# Patient Record
Sex: FEMALE | Race: BLACK OR AFRICAN AMERICAN | Employment: FULL TIME | URBAN - METROPOLITAN AREA
[De-identification: names, ages, dates, MRNs, and addresses within clinical notes are randomized per-mention and may not be internally consistent; named-entity substitution may affect disease eponyms.]

---

## 2017-01-23 ENCOUNTER — OFFICE VISIT (OUTPATIENT)
Dept: INTERNAL MEDICINE CLINIC | Age: 57
End: 2017-01-23

## 2017-01-23 VITALS
TEMPERATURE: 98 F | HEART RATE: 72 BPM | HEIGHT: 65 IN | SYSTOLIC BLOOD PRESSURE: 138 MMHG | WEIGHT: 189.6 LBS | DIASTOLIC BLOOD PRESSURE: 78 MMHG | BODY MASS INDEX: 31.59 KG/M2

## 2017-01-23 DIAGNOSIS — I10 ESSENTIAL HYPERTENSION: Primary | ICD-10-CM

## 2017-01-23 DIAGNOSIS — R05.9 COUGH: ICD-10-CM

## 2017-01-23 DIAGNOSIS — Z11.4 SCREENING FOR HIV (HUMAN IMMUNODEFICIENCY VIRUS): ICD-10-CM

## 2017-01-23 DIAGNOSIS — Z12.11 SCREENING FOR MALIGNANT NEOPLASM OF COLON: ICD-10-CM

## 2017-01-23 DIAGNOSIS — Z23 NEEDS FLU SHOT: ICD-10-CM

## 2017-01-23 DIAGNOSIS — Z12.39 SCREENING FOR MALIGNANT NEOPLASM OF BREAST: ICD-10-CM

## 2017-01-23 DIAGNOSIS — Z11.59 NEED FOR HEPATITIS C SCREENING TEST: ICD-10-CM

## 2017-01-23 LAB
A/G RATIO: 1.5 (ref 1.1–2.2)
ALBUMIN SERPL-MCNC: 4.3 G/DL (ref 3.4–5)
ALP BLD-CCNC: 101 U/L (ref 40–129)
ALT SERPL-CCNC: 19 U/L (ref 10–40)
ANION GAP SERPL CALCULATED.3IONS-SCNC: 16 MMOL/L (ref 3–16)
AST SERPL-CCNC: 19 U/L (ref 15–37)
BILIRUB SERPL-MCNC: <0.2 MG/DL (ref 0–1)
BUN BLDV-MCNC: 23 MG/DL (ref 7–20)
CALCIUM SERPL-MCNC: 9.5 MG/DL (ref 8.3–10.6)
CHLORIDE BLD-SCNC: 100 MMOL/L (ref 99–110)
CHOLESTEROL, TOTAL: 161 MG/DL (ref 0–199)
CO2: 27 MMOL/L (ref 21–32)
CREAT SERPL-MCNC: 0.6 MG/DL (ref 0.6–1.1)
GFR AFRICAN AMERICAN: >60
GFR NON-AFRICAN AMERICAN: >60
GLOBULIN: 2.9 G/DL
GLUCOSE BLD-MCNC: 83 MG/DL (ref 70–99)
HDLC SERPL-MCNC: 66 MG/DL (ref 40–60)
HEPATITIS C ANTIBODY INTERPRETATION: NORMAL
LDL CHOLESTEROL CALCULATED: 85 MG/DL
POTASSIUM SERPL-SCNC: 4.5 MMOL/L (ref 3.5–5.1)
SODIUM BLD-SCNC: 143 MMOL/L (ref 136–145)
TOTAL PROTEIN: 7.2 G/DL (ref 6.4–8.2)
TRIGL SERPL-MCNC: 48 MG/DL (ref 0–150)
VLDLC SERPL CALC-MCNC: 10 MG/DL

## 2017-01-23 PROCEDURE — 90471 IMMUNIZATION ADMIN: CPT | Performed by: INTERNAL MEDICINE

## 2017-01-23 PROCEDURE — 90688 IIV4 VACCINE SPLT 0.5 ML IM: CPT | Performed by: INTERNAL MEDICINE

## 2017-01-23 PROCEDURE — 99213 OFFICE O/P EST LOW 20 MIN: CPT | Performed by: INTERNAL MEDICINE

## 2017-01-23 RX ORDER — ATENOLOL 50 MG/1
50 TABLET ORAL DAILY
Qty: 90 TABLET | Refills: 1 | Status: SHIPPED | OUTPATIENT
Start: 2017-01-23 | End: 2017-10-09 | Stop reason: SDUPTHER

## 2017-01-23 RX ORDER — LISINOPRIL AND HYDROCHLOROTHIAZIDE 25; 20 MG/1; MG/1
1 TABLET ORAL DAILY
Qty: 90 TABLET | Refills: 1 | Status: SHIPPED | OUTPATIENT
Start: 2017-01-23 | End: 2017-10-02 | Stop reason: SDUPTHER

## 2017-01-23 RX ORDER — ALBUTEROL SULFATE 90 UG/1
2 AEROSOL, METERED RESPIRATORY (INHALATION) EVERY 6 HOURS PRN
Qty: 1 INHALER | Refills: 0 | Status: SHIPPED | OUTPATIENT
Start: 2017-01-23 | End: 2017-11-29 | Stop reason: ALTCHOICE

## 2017-01-23 RX ORDER — BENZONATATE 100 MG/1
100 CAPSULE ORAL 3 TIMES DAILY PRN
Qty: 30 CAPSULE | Refills: 0 | Status: SHIPPED | OUTPATIENT
Start: 2017-01-23 | End: 2017-01-30

## 2017-01-23 RX ORDER — TRIAMCINOLONE ACETONIDE 1 MG/G
CREAM TOPICAL
Qty: 80 G | Refills: 1 | Status: SHIPPED | OUTPATIENT
Start: 2017-01-23 | End: 2017-06-14 | Stop reason: SDUPTHER

## 2017-01-23 ASSESSMENT — ENCOUNTER SYMPTOMS
CHOKING: 0
COUGH: 1
WHEEZING: 0
CHEST TIGHTNESS: 0
SHORTNESS OF BREATH: 1

## 2017-01-24 LAB — HIV-1 AND HIV-2 ANTIBODIES: NORMAL

## 2017-06-14 ENCOUNTER — OFFICE VISIT (OUTPATIENT)
Dept: INTERNAL MEDICINE CLINIC | Age: 57
End: 2017-06-14

## 2017-06-14 VITALS
TEMPERATURE: 98.2 F | HEART RATE: 96 BPM | WEIGHT: 186.2 LBS | BODY MASS INDEX: 31.02 KG/M2 | DIASTOLIC BLOOD PRESSURE: 64 MMHG | SYSTOLIC BLOOD PRESSURE: 124 MMHG | HEIGHT: 65 IN

## 2017-06-14 DIAGNOSIS — I10 ESSENTIAL HYPERTENSION: Primary | ICD-10-CM

## 2017-06-14 DIAGNOSIS — Z12.39 SCREENING FOR MALIGNANT NEOPLASM OF BREAST: ICD-10-CM

## 2017-06-14 DIAGNOSIS — L20.9 ATOPIC DERMATITIS, UNSPECIFIED TYPE: ICD-10-CM

## 2017-06-14 DIAGNOSIS — L81.0 POSTINFLAMMATORY HYPERPIGMENTATION: ICD-10-CM

## 2017-06-14 PROCEDURE — 99213 OFFICE O/P EST LOW 20 MIN: CPT | Performed by: INTERNAL MEDICINE

## 2017-06-14 RX ORDER — TRIAMCINOLONE ACETONIDE 1 MG/G
CREAM TOPICAL
Qty: 80 G | Refills: 1 | Status: SHIPPED | OUTPATIENT
Start: 2017-06-14 | End: 2017-10-02 | Stop reason: SDUPTHER

## 2017-06-14 ASSESSMENT — ENCOUNTER SYMPTOMS
ABDOMINAL PAIN: 0
WHEEZING: 0
CHEST TIGHTNESS: 0
COUGH: 0

## 2017-10-03 RX ORDER — TRIAMCINOLONE ACETONIDE 1 MG/G
CREAM TOPICAL
Qty: 45 G | Refills: 2 | Status: SHIPPED | OUTPATIENT
Start: 2017-10-03 | End: 2017-11-29 | Stop reason: SDUPTHER

## 2017-10-03 RX ORDER — LISINOPRIL AND HYDROCHLOROTHIAZIDE 25; 20 MG/1; MG/1
TABLET ORAL
Qty: 30 TABLET | Refills: 3 | Status: SHIPPED | OUTPATIENT
Start: 2017-10-03 | End: 2017-11-29 | Stop reason: SDUPTHER

## 2017-10-09 RX ORDER — ATENOLOL 50 MG/1
TABLET ORAL
Qty: 30 TABLET | Refills: 0 | Status: SHIPPED | OUTPATIENT
Start: 2017-10-09 | End: 2017-11-29 | Stop reason: SDUPTHER

## 2017-11-29 ENCOUNTER — OFFICE VISIT (OUTPATIENT)
Dept: INTERNAL MEDICINE CLINIC | Age: 57
End: 2017-11-29

## 2017-11-29 VITALS
SYSTOLIC BLOOD PRESSURE: 155 MMHG | HEART RATE: 61 BPM | BODY MASS INDEX: 31.79 KG/M2 | WEIGHT: 190.8 LBS | TEMPERATURE: 98 F | HEIGHT: 65 IN | DIASTOLIC BLOOD PRESSURE: 80 MMHG

## 2017-11-29 DIAGNOSIS — L30.9 ECZEMA, UNSPECIFIED TYPE: ICD-10-CM

## 2017-11-29 DIAGNOSIS — Z23 NEEDS FLU SHOT: ICD-10-CM

## 2017-11-29 DIAGNOSIS — I10 ESSENTIAL HYPERTENSION: Primary | ICD-10-CM

## 2017-11-29 LAB
ANION GAP SERPL CALCULATED.3IONS-SCNC: 15 MMOL/L (ref 3–16)
BUN BLDV-MCNC: 23 MG/DL (ref 7–20)
CALCIUM SERPL-MCNC: 9.7 MG/DL (ref 8.3–10.6)
CHLORIDE BLD-SCNC: 100 MMOL/L (ref 99–110)
CO2: 28 MMOL/L (ref 21–32)
CREAT SERPL-MCNC: 0.6 MG/DL (ref 0.6–1.1)
GFR AFRICAN AMERICAN: >60
GFR NON-AFRICAN AMERICAN: >60
GLUCOSE BLD-MCNC: 84 MG/DL (ref 70–99)
POTASSIUM SERPL-SCNC: 4.4 MMOL/L (ref 3.5–5.1)
SODIUM BLD-SCNC: 143 MMOL/L (ref 136–145)

## 2017-11-29 PROCEDURE — G8484 FLU IMMUNIZE NO ADMIN: HCPCS | Performed by: INTERNAL MEDICINE

## 2017-11-29 PROCEDURE — 3014F SCREEN MAMMO DOC REV: CPT | Performed by: INTERNAL MEDICINE

## 2017-11-29 PROCEDURE — 99213 OFFICE O/P EST LOW 20 MIN: CPT | Performed by: INTERNAL MEDICINE

## 2017-11-29 PROCEDURE — 90682 RIV4 VACC RECOMBINANT DNA IM: CPT | Performed by: INTERNAL MEDICINE

## 2017-11-29 PROCEDURE — 90471 IMMUNIZATION ADMIN: CPT | Performed by: INTERNAL MEDICINE

## 2017-11-29 PROCEDURE — G8427 DOCREV CUR MEDS BY ELIG CLIN: HCPCS | Performed by: INTERNAL MEDICINE

## 2017-11-29 PROCEDURE — 1036F TOBACCO NON-USER: CPT | Performed by: INTERNAL MEDICINE

## 2017-11-29 PROCEDURE — G8417 CALC BMI ABV UP PARAM F/U: HCPCS | Performed by: INTERNAL MEDICINE

## 2017-11-29 PROCEDURE — 3017F COLORECTAL CA SCREEN DOC REV: CPT | Performed by: INTERNAL MEDICINE

## 2017-11-29 RX ORDER — TRIAMCINOLONE ACETONIDE 1 MG/G
CREAM TOPICAL
Qty: 45 G | Refills: 3 | Status: SHIPPED | OUTPATIENT
Start: 2017-11-29 | End: 2018-06-13 | Stop reason: SDUPTHER

## 2017-11-29 RX ORDER — LISINOPRIL AND HYDROCHLOROTHIAZIDE 25; 20 MG/1; MG/1
TABLET ORAL
Qty: 30 TABLET | Refills: 5 | Status: SHIPPED | OUTPATIENT
Start: 2017-11-29 | End: 2018-06-13 | Stop reason: SDUPTHER

## 2017-11-29 RX ORDER — ATENOLOL 50 MG/1
TABLET ORAL
Qty: 30 TABLET | Refills: 5 | Status: SHIPPED | OUTPATIENT
Start: 2017-11-29 | End: 2018-06-13 | Stop reason: SDUPTHER

## 2017-11-29 ASSESSMENT — ENCOUNTER SYMPTOMS
WHEEZING: 0
COUGH: 0
CHEST TIGHTNESS: 0

## 2018-06-13 ENCOUNTER — OFFICE VISIT (OUTPATIENT)
Dept: INTERNAL MEDICINE CLINIC | Age: 58
End: 2018-06-13

## 2018-06-13 VITALS
TEMPERATURE: 98.3 F | SYSTOLIC BLOOD PRESSURE: 140 MMHG | HEART RATE: 70 BPM | WEIGHT: 186.2 LBS | DIASTOLIC BLOOD PRESSURE: 76 MMHG | BODY MASS INDEX: 31.02 KG/M2 | HEIGHT: 65 IN

## 2018-06-13 DIAGNOSIS — I10 ESSENTIAL HYPERTENSION: Primary | ICD-10-CM

## 2018-06-13 DIAGNOSIS — Z12.39 SCREENING FOR MALIGNANT NEOPLASM OF BREAST: ICD-10-CM

## 2018-06-13 PROCEDURE — G8417 CALC BMI ABV UP PARAM F/U: HCPCS | Performed by: INTERNAL MEDICINE

## 2018-06-13 PROCEDURE — 3017F COLORECTAL CA SCREEN DOC REV: CPT | Performed by: INTERNAL MEDICINE

## 2018-06-13 PROCEDURE — 1036F TOBACCO NON-USER: CPT | Performed by: INTERNAL MEDICINE

## 2018-06-13 PROCEDURE — 99213 OFFICE O/P EST LOW 20 MIN: CPT | Performed by: INTERNAL MEDICINE

## 2018-06-13 PROCEDURE — G8427 DOCREV CUR MEDS BY ELIG CLIN: HCPCS | Performed by: INTERNAL MEDICINE

## 2018-06-13 RX ORDER — TRIAMCINOLONE ACETONIDE 1 MG/G
CREAM TOPICAL
Qty: 45 G | Refills: 3 | Status: SHIPPED | OUTPATIENT
Start: 2018-06-13 | End: 2018-11-26 | Stop reason: SDUPTHER

## 2018-06-13 RX ORDER — LISINOPRIL AND HYDROCHLOROTHIAZIDE 25; 20 MG/1; MG/1
TABLET ORAL
Qty: 30 TABLET | Refills: 5 | Status: SHIPPED | OUTPATIENT
Start: 2018-06-13 | End: 2019-04-09 | Stop reason: SDUPTHER

## 2018-06-13 RX ORDER — ATENOLOL 50 MG/1
TABLET ORAL
Qty: 30 TABLET | Refills: 5 | Status: SHIPPED | OUTPATIENT
Start: 2018-06-13 | End: 2019-04-09 | Stop reason: SDUPTHER

## 2018-06-13 ASSESSMENT — PATIENT HEALTH QUESTIONNAIRE - PHQ9
SUM OF ALL RESPONSES TO PHQ QUESTIONS 1-9: 0
SUM OF ALL RESPONSES TO PHQ9 QUESTIONS 1 & 2: 0
2. FEELING DOWN, DEPRESSED OR HOPELESS: 0
1. LITTLE INTEREST OR PLEASURE IN DOING THINGS: 0

## 2018-06-13 ASSESSMENT — ENCOUNTER SYMPTOMS
SHORTNESS OF BREATH: 0
CHEST TIGHTNESS: 0
COUGH: 0

## 2018-11-26 RX ORDER — TRIAMCINOLONE ACETONIDE 1 MG/G
CREAM TOPICAL
Qty: 45 G | Refills: 2 | Status: SHIPPED | OUTPATIENT
Start: 2018-11-26 | End: 2019-05-08 | Stop reason: SDUPTHER

## 2019-04-05 ENCOUNTER — OFFICE VISIT (OUTPATIENT)
Dept: PRIMARY CARE CLINIC | Age: 59
End: 2019-04-05
Payer: COMMERCIAL

## 2019-04-05 VITALS
TEMPERATURE: 98.8 F | BODY MASS INDEX: 29.95 KG/M2 | WEIGHT: 177.2 LBS | HEART RATE: 68 BPM | SYSTOLIC BLOOD PRESSURE: 140 MMHG | DIASTOLIC BLOOD PRESSURE: 76 MMHG

## 2019-04-05 DIAGNOSIS — E78.5 HYPERLIPIDEMIA, UNSPECIFIED HYPERLIPIDEMIA TYPE: ICD-10-CM

## 2019-04-05 DIAGNOSIS — I10 ESSENTIAL HYPERTENSION: Primary | ICD-10-CM

## 2019-04-05 DIAGNOSIS — I10 ESSENTIAL HYPERTENSION: ICD-10-CM

## 2019-04-05 DIAGNOSIS — M19.90 OSTEOARTHRITIS, UNSPECIFIED OSTEOARTHRITIS TYPE, UNSPECIFIED SITE: ICD-10-CM

## 2019-04-05 LAB
ANION GAP SERPL CALCULATED.3IONS-SCNC: 14 MMOL/L (ref 3–16)
BASOPHILS ABSOLUTE: 0 K/UL (ref 0–0.2)
BASOPHILS RELATIVE PERCENT: 0.8 %
BUN BLDV-MCNC: 20 MG/DL (ref 7–20)
CALCIUM SERPL-MCNC: 10 MG/DL (ref 8.3–10.6)
CHLORIDE BLD-SCNC: 102 MMOL/L (ref 99–110)
CHOLESTEROL, TOTAL: 171 MG/DL (ref 0–199)
CO2: 28 MMOL/L (ref 21–32)
CREAT SERPL-MCNC: <0.5 MG/DL (ref 0.6–1.1)
EOSINOPHILS ABSOLUTE: 0.1 K/UL (ref 0–0.6)
EOSINOPHILS RELATIVE PERCENT: 1.5 %
GFR AFRICAN AMERICAN: >60
GFR NON-AFRICAN AMERICAN: >60
GLUCOSE BLD-MCNC: 73 MG/DL (ref 70–99)
HCT VFR BLD CALC: 42.9 % (ref 36–48)
HDLC SERPL-MCNC: 79 MG/DL (ref 40–60)
HEMOGLOBIN: 14.5 G/DL (ref 12–16)
LDL CHOLESTEROL CALCULATED: 82 MG/DL
LYMPHOCYTES ABSOLUTE: 2 K/UL (ref 1–5.1)
LYMPHOCYTES RELATIVE PERCENT: 42.2 %
MCH RBC QN AUTO: 30.9 PG (ref 26–34)
MCHC RBC AUTO-ENTMCNC: 33.9 G/DL (ref 31–36)
MCV RBC AUTO: 91 FL (ref 80–100)
MONOCYTES ABSOLUTE: 0.4 K/UL (ref 0–1.3)
MONOCYTES RELATIVE PERCENT: 8.2 %
NEUTROPHILS ABSOLUTE: 2.2 K/UL (ref 1.7–7.7)
NEUTROPHILS RELATIVE PERCENT: 47.3 %
PDW BLD-RTO: 13.9 % (ref 12.4–15.4)
PLATELET # BLD: 236 K/UL (ref 135–450)
PMV BLD AUTO: 8 FL (ref 5–10.5)
POTASSIUM SERPL-SCNC: 3.9 MMOL/L (ref 3.5–5.1)
RBC # BLD: 4.71 M/UL (ref 4–5.2)
SODIUM BLD-SCNC: 144 MMOL/L (ref 136–145)
TRIGL SERPL-MCNC: 49 MG/DL (ref 0–150)
TSH SERPL DL<=0.05 MIU/L-ACNC: 1.53 UIU/ML (ref 0.27–4.2)
VITAMIN D 25-HYDROXY: 27.2 NG/ML
VLDLC SERPL CALC-MCNC: 10 MG/DL
WBC # BLD: 4.7 K/UL (ref 4–11)

## 2019-04-05 PROCEDURE — G8427 DOCREV CUR MEDS BY ELIG CLIN: HCPCS | Performed by: INTERNAL MEDICINE

## 2019-04-05 PROCEDURE — G8417 CALC BMI ABV UP PARAM F/U: HCPCS | Performed by: INTERNAL MEDICINE

## 2019-04-05 PROCEDURE — 3017F COLORECTAL CA SCREEN DOC REV: CPT | Performed by: INTERNAL MEDICINE

## 2019-04-05 PROCEDURE — 1036F TOBACCO NON-USER: CPT | Performed by: INTERNAL MEDICINE

## 2019-04-05 PROCEDURE — 99214 OFFICE O/P EST MOD 30 MIN: CPT | Performed by: INTERNAL MEDICINE

## 2019-04-05 RX ORDER — METHOCARBAMOL 500 MG/1
500 TABLET, FILM COATED ORAL DAILY
Qty: 30 TABLET | Refills: 0 | Status: SHIPPED | OUTPATIENT
Start: 2019-04-05 | End: 2019-05-07 | Stop reason: SDUPTHER

## 2019-04-05 RX ORDER — NAPROXEN 250 MG/1
250 TABLET ORAL DAILY
Qty: 60 TABLET | Refills: 0 | Status: ON HOLD | OUTPATIENT
Start: 2019-04-05 | End: 2019-06-18 | Stop reason: HOSPADM

## 2019-04-05 NOTE — PROGRESS NOTES
Xena Chavarria  YOB: 1960    Date of Service:  4/5/2019    Chief Complaint:   Xena Chavarria is a 60 y.o. female who presents for chronic health problems. HPI: New patient:  patient came here for chronic health problems. Hypertension : stable patient is on  Prinzide. Patient denies any side effects of medication. Patient is not monitoring  bp at home. Encouraged to monitor bp at home. OA: patient requested medication refill for pain. Hyperlipidemia : stable . Patient discussed to get lipid panel. Patient denies any active complaints today. Review of Systems:  Constitutional: Negative for chills, fever, malaise/fatigue and weight loss. HENT: Negative for headaches, ear discharge, ear pain, hearing loss, sore throat,   blurry vision and double vision. Respiratory: Negative for cough, hemoptysis, sputum production, shortness of breath and wheezing. Cardiovascular: Negative for chest pain, palpitations, orthopnea, claudication and leg swelling. Gastrointestinal: Negative for abdominal pain, nausea and vomiting, constipation, diarrhea, heartburn, blood in stool, melena. Genitourinary: Negative for dysuria, flank pain, frequency, hematuria and urgency. Musculoskeletal: Negative for back pain, myalgias and neck pain. Neurological: Negative for dizziness, seizure, sensory change, focal weakness, loss of consciousness . Psychiatric/Behavioral: Negative for depression and substance abuse. The patient does not have insomnia. Vitals:    04/05/19 1018   BP: (!) 140/76   Pulse:    Temp:      Wt Readings from Last 3 Encounters:   04/05/19 177 lb 3.2 oz (80.4 kg)   06/13/18 186 lb 3.2 oz (84.5 kg)   11/29/17 190 lb 12.8 oz (86.5 kg)     BP Readings from Last 3 Encounters:   04/05/19 (!) 140/76   06/13/18 (!) 140/76   11/29/17 (!) 155/80         Physical Exam:  Constitutional:   walking with cane. No distress.     HENT:   Head: Normocephalic. Right Ear: External ear normal.   Left Ear: External ear normal.   Mouth/Throat: Oropharynx is clear and moist. No oropharyngeal exudate. Eyes: Conjunctivae and EOM are normal. Pupils are equal, round, and reactive to light. Right eye exhibits no discharge. Left eye exhibits no discharge. No scleral icterus. Neck: Neck supple. No JVD present. No thyromegaly present. Cardiovascular:  Normal rate, regular rhythm, normal heart sounds and intact distal pulses. Exam reveals no gallop. No murmur heard. no edema. Chest: Effort normal and breath sounds normal. no wheezes. has no rales. Abdominal: Soft, Bowel sounds are normal.  exhibits no distension and no mass. Musculoskeletal: Normal range of motion in all extremities. Lymphadenopathy: No cervical adenopathy. Neurological:  exhibits normal muscle tone. No sensory or motor deficit, Coordination normal, normal reflexes. Skin: Skin is warm. No rash noted. No Erythema. Psychiatric:  alert and oriented to person, place, and time. Normal mood and affect.          Immunization History   Administered Date(s) Administered    Influenza Virus Vaccine 10/28/2014, 12/07/2015    Influenza, Zavala Jose Angel, 3 Years and older, IM (Fluzone 3 yrs and older or Afluria 5 yrs and older) 01/23/2017    Influenza, Lucas Basurtoin, Recombinant, IM PF (Flublok 18 yrs and older) 11/29/2017    Tdap (Boostrix, Adacel) 06/11/2015       No Known Allergies  Current Outpatient Medications   Medication Sig Dispense Refill    naproxen (NAPROSYN) 250 MG tablet Take 1 tablet by mouth daily 60 tablet 0    methocarbamol (ROBAXIN) 500 MG tablet Take 1 tablet by mouth daily 30 tablet 0    diclofenac sodium 1 % GEL Apply 2 g topically 2 times daily 2 Tube 1    triamcinolone (KENALOG) 0.1 % cream APPLY TOPICALLY TWICE A DAY 45 g 2    hydrocortisone 2.5 % cream APPLY TO THE FACE TWO TIMES A DAY AS NEEDED 28 g 2    atenolol (TENORMIN) 50 MG tablet TAKE ONE TABLET BY MOUTH DAILY 30 tablet 5    lisinopril-hydrochlorothiazide (PRINZIDE;ZESTORETIC) 20-25 MG per tablet TAKE ONE TABLET BY MOUTH DAILY 30 tablet 5     No current facility-administered medications for this visit. Past Medical History:   Diagnosis Date    Eczema     Hypertension      No past surgical history on file. Family History   Problem Relation Age of Onset    High Blood Pressure Mother     Other Mother         dementia     Social History     Socioeconomic History    Marital status:      Spouse name: Not on file    Number of children: Not on file    Years of education: Not on file    Highest education level: Not on file   Occupational History    Not on file   Social Needs    Financial resource strain: Not on file    Food insecurity:     Worry: Not on file     Inability: Not on file    Transportation needs:     Medical: Not on file     Non-medical: Not on file   Tobacco Use    Smoking status: Never Smoker    Smokeless tobacco: Never Used   Substance and Sexual Activity    Alcohol use: Yes     Comment: 2 cans beer a day    Drug use: No    Sexual activity: Yes     Partners: Male   Lifestyle    Physical activity:     Days per week: Not on file     Minutes per session: Not on file    Stress: Not on file   Relationships    Social connections:     Talks on phone: Not on file     Gets together: Not on file     Attends Faith service: Not on file     Active member of club or organization: Not on file     Attends meetings of clubs or organizations: Not on file     Relationship status: Not on file    Intimate partner violence:     Fear of current or ex partner: Not on file     Emotionally abused: Not on file     Physically abused: Not on file     Forced sexual activity: Not on file   Other Topics Concern    Not on file   Social History Narrative    Not on file       Assessment/Plan:    1. Essential hypertension  No blood work for two years  - Basic Metabolic Panel;  Future  - TSH without Reflex; Future  - Lipid Panel; Future  - CBC Auto Differential; Future  - Vitamin D 25 Hydroxy; Future  Lisinopril -HCTZ  Monitor blood pressure regularly  Regular exercise and low salt diet      2. Osteoarthritis, unspecified osteoarthritis type, unspecified site  stable  - Ambulatory referral to Orthopedic Surgery  - naproxen (NAPROSYN) 250 MG tablet; Take 1 tablet by mouth daily  Dispense: 60 tablet; Refill: 0  - methocarbamol (ROBAXIN) 500 MG tablet; Take 1 tablet by mouth daily  Dispense: 30 tablet; Refill:   - diclofenac sodium 1 % GEL; Apply 2 g topically 2 times daily  Dispense: 2 Tube; Refill: 1    3.  Hyperlipidemia, unspecified hyperlipidemia type  Stable   Lipid panel   Regular exercise and low fat diet

## 2019-04-08 RX ORDER — ERGOCALCIFEROL 1.25 MG/1
50000 CAPSULE ORAL WEEKLY
Qty: 8 CAPSULE | Refills: 0 | Status: SHIPPED | OUTPATIENT
Start: 2019-04-08 | End: 2020-10-06 | Stop reason: ALTCHOICE

## 2019-04-09 ENCOUNTER — TELEPHONE (OUTPATIENT)
Dept: PRIMARY CARE CLINIC | Age: 59
End: 2019-04-09

## 2019-04-09 DIAGNOSIS — M19.90 OSTEOARTHRITIS, UNSPECIFIED OSTEOARTHRITIS TYPE, UNSPECIFIED SITE: ICD-10-CM

## 2019-04-09 RX ORDER — ATENOLOL 50 MG/1
TABLET ORAL
Qty: 90 TABLET | Refills: 1 | Status: ON HOLD | OUTPATIENT
Start: 2019-04-09 | End: 2019-06-18 | Stop reason: HOSPADM

## 2019-04-09 RX ORDER — LISINOPRIL AND HYDROCHLOROTHIAZIDE 25; 20 MG/1; MG/1
TABLET ORAL
Qty: 90 TABLET | Refills: 1 | Status: ON HOLD | OUTPATIENT
Start: 2019-04-09 | End: 2019-06-18 | Stop reason: HOSPADM

## 2019-04-09 NOTE — TELEPHONE ENCOUNTER
PT CALLING BACK ABOUT TEST RESULTS. READ HER DR'S NOTE ABOUT VITAMIN D.  SHE SAYS SHE ALREADY TAKES VITAMIN D TWICE A DAY AT EITHER 500  MG. SHOULD SHE TAKE BOTH RX AND THE ONE SHE HAS ?? ALSO, SHE SAYS SHE NEEDS HER BP MEDS CALLED IN UNDER DR EVANS'S NAME NOW TO Kendall Molina [phone].   SHE SAYS SHE NEEDS ATENOLOL AND LISINOPRIL/HCTZ CALLED IN

## 2019-05-03 ENCOUNTER — TELEPHONE (OUTPATIENT)
Dept: ORTHOPEDIC SURGERY | Age: 59
End: 2019-05-03

## 2019-05-03 ENCOUNTER — OFFICE VISIT (OUTPATIENT)
Dept: ORTHOPEDIC SURGERY | Age: 59
End: 2019-05-03
Payer: COMMERCIAL

## 2019-05-03 VITALS — WEIGHT: 177.25 LBS | HEIGHT: 64 IN | BODY MASS INDEX: 30.26 KG/M2

## 2019-05-03 DIAGNOSIS — M16.11 PRIMARY OSTEOARTHRITIS OF RIGHT HIP: Primary | ICD-10-CM

## 2019-05-03 DIAGNOSIS — M25.551 RIGHT HIP PAIN: Primary | ICD-10-CM

## 2019-05-03 DIAGNOSIS — M16.11 PRIMARY OSTEOARTHRITIS OF RIGHT HIP: ICD-10-CM

## 2019-05-03 PROCEDURE — 99243 OFF/OP CNSLTJ NEW/EST LOW 30: CPT | Performed by: ORTHOPAEDIC SURGERY

## 2019-05-03 PROCEDURE — G8427 DOCREV CUR MEDS BY ELIG CLIN: HCPCS | Performed by: ORTHOPAEDIC SURGERY

## 2019-05-03 PROCEDURE — G8417 CALC BMI ABV UP PARAM F/U: HCPCS | Performed by: ORTHOPAEDIC SURGERY

## 2019-05-03 NOTE — TELEPHONE ENCOUNTER
PATIENT CALLED WANTING TO SEE IF SOMEONE WOULD CALL HER TO LET HER KNOW IF THERE IS ANOTHER DR CLOSER TO HER THAT CAN DO HER HIP REPLACEMENT SURGERY. DR URIARTE REFERRED HER TO DR CHAUDHARY IN [place] BUT SHE SAID SHE DOESN'T LIKE TO DRIVE ON THE [address]. SHE CAN BE REACHED [phone].  Zuleima Osei

## 2019-05-03 NOTE — PROGRESS NOTES
Chief Complaint    Hip Pain (right hip)      History of Present Illness:  Omero Tucker is a 60 y.o. female. She is here for consultation for her right hip at the referral of Dr. Donald Wakefield. She has had right hip pain that is been getting progressively worse over the past year. She states she had a fall onto her right side a year ago and ever since then things a bit getting progressively worse. She denies radicular pain. Denies numbness or tingling. She does have pain with any type of weightbearing on her right lower extremity. She did go to an urgent care and did get a prescription for naproxen and has been using that states it has been helping. She is also just started using a cane to assist in ambulation. Medical History:  Patient's medications, allergies, past medical, surgical, social and family histories were reviewed and updated as appropriate. Review of Systems:  Pertinent items are noted in HPI  Review of systems reviewed from Patient History Form dated on 5/3/19 and available in the patient's chart under the Media tab. Vital Signs:  Ht 5' 4.49\" (1.638 m)   Wt 177 lb 4 oz (80.4 kg)   BMI 29.97 kg/m²     General Exam:   Constitutional: Patient is adequately groomed with no evidence of malnutrition  DTRs: Deep tendon reflexes are intact  Mental Status: The patient is oriented to time, place and person. The patient's mood and affect are appropriate. Knee Examination:    Inspection:  No significant swelling erythema noted about the right hip today    Palpation:  No significant tenderness over the trochanteric bursa or along the iliac crest    Range of Motion:  She does have about a 20° flexion contracture about her right hip. Hip flexion today was to roughly 60°.   She essentially has no internal/external rotation about the right hip    Strength:  She does have weakness with resisted hip flexion as well as resisted abduction and adduction    Special Tests:  She has a positive [facility] exam    Skin: There are no rashes, ulcerations or lesions. Gait: She is walking with an antalgic gait    Additional Comments:       Additional Examinations:         Lower Back: Examination of the lower back does not show any tenderness, deformity or injury. Range of motion is unremarkable. There is no gross instability. There are no rashes, ulcerations or lesions. Strength and tone are normal.    Radiology:     X-rays obtained and reviewed in office:  Views 2 views of the right hip does demonstrate significant end-stage osteoarthritis within the right hip with complete loss of joint space as well as osteophyte formation. Assessment :  Right hip end-stage osteoarthritis    Impression:  Encounter Diagnoses   Name Primary?  Right hip pain Yes    Primary osteoarthritis of right hip        Office Procedures:  Orders Placed This Encounter   Procedures    XR HIP RIGHT (2-3 VIEWS)     Standing Status:   Future     Number of Occurrences:   1     Standing Expiration Date:   5/3/2020       Treatment Plan:  I discussed the diagnosis and treatment options with her today. I think her best option at this point in time would be a right total hip arthroplasty. I'm going to refer her to Dr. Thom Fischer for surgical consultation. She will continue to use her cane as well as continue use of naproxen to help manage symptoms in the meantime.   I'll see her back for any further problems

## 2019-05-07 DIAGNOSIS — M19.90 OSTEOARTHRITIS, UNSPECIFIED OSTEOARTHRITIS TYPE, UNSPECIFIED SITE: ICD-10-CM

## 2019-05-07 RX ORDER — METHOCARBAMOL 500 MG/1
TABLET, FILM COATED ORAL
Qty: 30 TABLET | Refills: 0 | Status: SHIPPED | OUTPATIENT
Start: 2019-05-07 | End: 2019-11-04

## 2019-05-09 RX ORDER — TRIAMCINOLONE ACETONIDE 1 MG/G
CREAM TOPICAL
Qty: 45 G | Refills: 2 | Status: SHIPPED | OUTPATIENT
Start: 2019-05-09 | End: 2019-08-29 | Stop reason: SDUPTHER

## 2019-05-22 ENCOUNTER — OFFICE VISIT (OUTPATIENT)
Dept: ORTHOPEDIC SURGERY | Age: 59
End: 2019-05-22
Payer: COMMERCIAL

## 2019-05-22 VITALS
HEIGHT: 65 IN | HEART RATE: 80 BPM | BODY MASS INDEX: 28.99 KG/M2 | SYSTOLIC BLOOD PRESSURE: 151 MMHG | DIASTOLIC BLOOD PRESSURE: 82 MMHG | WEIGHT: 174 LBS

## 2019-05-22 DIAGNOSIS — M16.11 PRIMARY OSTEOARTHRITIS OF RIGHT HIP: Primary | ICD-10-CM

## 2019-05-22 PROCEDURE — G8428 CUR MEDS NOT DOCUMENT: HCPCS | Performed by: ORTHOPAEDIC SURGERY

## 2019-05-22 PROCEDURE — 99213 OFFICE O/P EST LOW 20 MIN: CPT | Performed by: ORTHOPAEDIC SURGERY

## 2019-05-22 PROCEDURE — 3017F COLORECTAL CA SCREEN DOC REV: CPT | Performed by: ORTHOPAEDIC SURGERY

## 2019-05-22 PROCEDURE — 1036F TOBACCO NON-USER: CPT | Performed by: ORTHOPAEDIC SURGERY

## 2019-05-22 PROCEDURE — G8417 CALC BMI ABV UP PARAM F/U: HCPCS | Performed by: ORTHOPAEDIC SURGERY

## 2019-05-22 RX ORDER — OXYCODONE HYDROCHLORIDE AND ACETAMINOPHEN 5; 325 MG/1; MG/1
1 TABLET ORAL EVERY 6 HOURS PRN
Qty: 28 TABLET | Refills: 0 | Status: SHIPPED | OUTPATIENT
Start: 2019-05-22 | End: 2019-05-29

## 2019-05-22 RX ORDER — VITAMIN A, VITAMIN C, VITAMIN D-3, VITAMIN E, VITAMIN B-1, VITAMIN B-2, NIACIN, VITAMIN B-6, CALCIUM, IRON, ZINC, COPPER 4000; 120; 400; 22; 1.84; 3; 20; 10; 1; 12; 200; 27; 25; 2 [IU]/1; MG/1; [IU]/1; MG/1; MG/1; MG/1; MG/1; MG/1; MG/1; UG/1; MG/1; MG/1; MG/1; MG/1
1 TABLET ORAL 2 TIMES DAILY
Qty: 60 TABLET | Refills: 1 | Status: SHIPPED | OUTPATIENT
Start: 2019-05-22 | End: 2019-07-24 | Stop reason: SDUPTHER

## 2019-05-22 NOTE — PROGRESS NOTES
(NAPROSYN) 250 MG tablet Take 1 tablet by mouth daily 4/5/19   Mikaela Medrano MD     Medical History:  has a past medical history of Eczema and Hypertension. Allergies: No Known Allergies    Review of Systems:   Review of Systems   Constitutional: Negative for chills and fever. HENT: Negative for nosebleeds. Eyes: Negative for double vision. Cardiovascular: Negative for chest pain. Gastrointestinal: Negative for abdominal pain. Musculoskeletal: Positive for joint pain and myalgias. Skin: Negative for rash. Neurological: Negative for seizures. Psychiatric/Behavioral: Negative for hallucinations. Assessment   Vital Signs:     BP (!) 151/82   Pulse 80   Ht 5' 5\" (1.651 m)   Wt 174 lb (78.9 kg)   BMI 28.96 kg/m²     Physical Exam   Constitutional: Patient is oriented to person, place, and time and well-developed, well-nourished, and in no distress. HENT:   Head: Normocephalic and atraumatic. Eyes: Pupils are equal, round, and reactive to light. Neck: No tracheal deviation present. No thyromegaly present. Pulmonary/Chest: Effort normal.   Abdominal: Soft. There is no guarding. Musculoskeletal: Patient exhibits tenderness and pain. Neurological: Patient is alert and oriented to person, place, and time. Skin: Skin is warm. Psychiatric: Affect normal.       Left Hip Examination    Inspection: There is no swelling or ecchymosis. There is no obvious deformity. Palpation: There is tenderness over the greater trochanter. There is anterior groin tenderness. Range of Motion: FLEX80 ER20  IR0    Strength: Hip flexors rated: 4/5. Special Tests: Trendelenberg sign: positive       Skin: There are no rashes, ulcerations or lesions. Gait: Patient walks with a limp. Skin shows no rashes/ecchymosis to the affected area, no hyperesthesias, no discoloration, no temperature or color discrepancies.    NEUROLOGICALLY: There is no evidence for sensory or motor deficits in the extremity. Coordination appears full with no spacticity or rigidity. Reflexes appear to be symmetric. Distal circulation intact. No signs of RSD. Flexion contracture. Right Hip Examination    Inspection: There is no swelling or ecchymosis. There is no obvious deformity. Palpation: There is tenderness over the greater trochanter. There is anterior groin tenderness. Range of Motion: FLEX80 ER5  IR-5  Flexion contracture of the right side. Strength: Hip flexors rated: 4/5. Special Tests: Trendelenberg sign: positive       Skin: There are no rashes, ulcerations or lesions. Gait: Patient walks with a limp. Skin shows no rashes/ecchymosis to the affected area, no hyperesthesias, no discoloration, no temperature or color discrepancies. NEUROLOGICALLY: There is no evidence for sensory or motor deficits in the extremity. Coordination appears full with no spacticity or rigidity. Reflexes appear to be symmetric. Distal circulation intact. No signs of RSD  Additional Comments:     Diagnostic Test Findings: severe osteoarthritis on ap and lateral of the bialteral hips with noted medial and superior arthritis worst on the right than the left hip. Procedure(s): none. Assessment and Plan:  1. Primary osteoarthritis of right hip        No follow-ups on file. The orders below, if any, were placed during this visit:   No orders of the defined types were placed in this encounter. Impression:   [unfilled]    Treatment Plan: Because he has such persistent pain and poor function currently, he does not wish to continue living in this way. Total Hip Arthroplasty has been recommended. All potential risks, the duration of hospitalization and rehab have been discussed. Surgery will be scheduled after medical clearance has been received. Potentially both hips will require surgical intervention. No signs of additional issues of autoimmune disease.        Start on pain medications to see if she can mobilize on the arthritis hips prior to surgery.    Royer Prado MD

## 2019-05-22 NOTE — LETTER
[address]  Surgery Precert & Billing Form:    DEMOGRAPHICS:                                                                                                       Patient Name:  Jessica Riddle  Patient :  1960   Patient SS#:      Patient Phone:  [phone] (home) [phone] (work) Alt.  Patient Phone:    Patient Address:  [place] [address] [address]  [facility] [address]    PCP:  Rodriguez Dougherty MD  Insurance: [facility]  DIAGNOSIS & PROCEDURE:                                                                                      Diagnosis: Right Hip OA  Operation: right    SURGERY  INFORMATION  Date of Surgery:   19  Location:    [facility]  Type:    Inpatient  23 hour hold:  No  Surgeon:        Ravinder Evans MD         19     BILLING INFORMATION:                                                                                                Physician Procedure                                            CPT Codes                      PA, or Fellow Procedure                                      CPT Codes                      Precert information:

## 2019-05-31 ENCOUNTER — PROCEDURE VISIT (OUTPATIENT)
Dept: PRIMARY CARE CLINIC | Age: 59
End: 2019-05-31
Payer: COMMERCIAL

## 2019-05-31 VITALS
SYSTOLIC BLOOD PRESSURE: 130 MMHG | BODY MASS INDEX: 28.99 KG/M2 | OXYGEN SATURATION: 97 % | WEIGHT: 174 LBS | HEART RATE: 73 BPM | HEIGHT: 65 IN | DIASTOLIC BLOOD PRESSURE: 80 MMHG

## 2019-05-31 DIAGNOSIS — Z01.818 PRE-OP EXAM: ICD-10-CM

## 2019-05-31 DIAGNOSIS — Z01.818 PRE-OP EXAM: Primary | ICD-10-CM

## 2019-05-31 LAB
A/G RATIO: 1.7 (ref 1.1–2.2)
ALBUMIN SERPL-MCNC: 4.9 G/DL (ref 3.4–5)
ALP BLD-CCNC: 103 U/L (ref 40–129)
ALT SERPL-CCNC: 13 U/L (ref 10–40)
ANION GAP SERPL CALCULATED.3IONS-SCNC: 14 MMOL/L (ref 3–16)
AST SERPL-CCNC: 18 U/L (ref 15–37)
BASOPHILS ABSOLUTE: 0.1 K/UL (ref 0–0.2)
BASOPHILS RELATIVE PERCENT: 1.5 %
BILIRUB SERPL-MCNC: 0.3 MG/DL (ref 0–1)
BUN BLDV-MCNC: 27 MG/DL (ref 7–20)
CALCIUM SERPL-MCNC: 9.8 MG/DL (ref 8.3–10.6)
CHLORIDE BLD-SCNC: 100 MMOL/L (ref 99–110)
CO2: 27 MMOL/L (ref 21–32)
CREAT SERPL-MCNC: 0.6 MG/DL (ref 0.6–1.1)
EOSINOPHILS ABSOLUTE: 0.1 K/UL (ref 0–0.6)
EOSINOPHILS RELATIVE PERCENT: 3.5 %
GFR AFRICAN AMERICAN: >60
GFR NON-AFRICAN AMERICAN: >60
GLOBULIN: 2.9 G/DL
GLUCOSE BLD-MCNC: 89 MG/DL (ref 70–99)
HCT VFR BLD CALC: 43.4 % (ref 36–48)
HEMOGLOBIN: 14.4 G/DL (ref 12–16)
LYMPHOCYTES ABSOLUTE: 1.5 K/UL (ref 1–5.1)
LYMPHOCYTES RELATIVE PERCENT: 35 %
MCH RBC QN AUTO: 30.5 PG (ref 26–34)
MCHC RBC AUTO-ENTMCNC: 33.2 G/DL (ref 31–36)
MCV RBC AUTO: 91.9 FL (ref 80–100)
MONOCYTES ABSOLUTE: 0.3 K/UL (ref 0–1.3)
MONOCYTES RELATIVE PERCENT: 7.2 %
NEUTROPHILS ABSOLUTE: 2.2 K/UL (ref 1.7–7.7)
NEUTROPHILS RELATIVE PERCENT: 52.8 %
PDW BLD-RTO: 14 % (ref 12.4–15.4)
PLATELET # BLD: 227 K/UL (ref 135–450)
PMV BLD AUTO: 8.3 FL (ref 5–10.5)
POTASSIUM SERPL-SCNC: 4.2 MMOL/L (ref 3.5–5.1)
RBC # BLD: 4.72 M/UL (ref 4–5.2)
SODIUM BLD-SCNC: 141 MMOL/L (ref 136–145)
TOTAL PROTEIN: 7.8 G/DL (ref 6.4–8.2)
WBC # BLD: 4.2 K/UL (ref 4–11)

## 2019-05-31 PROCEDURE — G8427 DOCREV CUR MEDS BY ELIG CLIN: HCPCS | Performed by: NURSE PRACTITIONER

## 2019-05-31 PROCEDURE — 93000 ELECTROCARDIOGRAM COMPLETE: CPT | Performed by: NURSE PRACTITIONER

## 2019-05-31 PROCEDURE — 99214 OFFICE O/P EST MOD 30 MIN: CPT | Performed by: NURSE PRACTITIONER

## 2019-05-31 PROCEDURE — 1036F TOBACCO NON-USER: CPT | Performed by: NURSE PRACTITIONER

## 2019-05-31 PROCEDURE — 3017F COLORECTAL CA SCREEN DOC REV: CPT | Performed by: NURSE PRACTITIONER

## 2019-05-31 PROCEDURE — G8417 CALC BMI ABV UP PARAM F/U: HCPCS | Performed by: NURSE PRACTITIONER

## 2019-05-31 RX ORDER — OXYCODONE HYDROCHLORIDE AND ACETAMINOPHEN 5; 325 MG/1; MG/1
1 TABLET ORAL EVERY 6 HOURS PRN
Status: ON HOLD | COMMUNITY
End: 2019-06-18 | Stop reason: HOSPADM

## 2019-05-31 ASSESSMENT — PATIENT HEALTH QUESTIONNAIRE - PHQ9
SUM OF ALL RESPONSES TO PHQ QUESTIONS 1-9: 0
SUM OF ALL RESPONSES TO PHQ9 QUESTIONS 1 & 2: 0
1. LITTLE INTEREST OR PLEASURE IN DOING THINGS: 0
2. FEELING DOWN, DEPRESSED OR HOPELESS: 0
SUM OF ALL RESPONSES TO PHQ QUESTIONS 1-9: 0

## 2019-05-31 NOTE — PROGRESS NOTES
Preoperative Consultation      Kathleen Maldonado  YOB: 1960    Date of Service:  5/31/2019    Vitals:    05/31/19 0951   BP: 130/80   Pulse: 73   SpO2: 97%   Weight: 174 lb (78.9 kg)   Height: 5' 5\" (1.651 m)      Wt Readings from Last 2 Encounters:   05/31/19 174 lb (78.9 kg)   05/22/19 174 lb (78.9 kg)     BP Readings from Last 3 Encounters:   05/31/19 130/80   05/22/19 (!) 151/82   04/05/19 (!) 140/76        Chief Complaint   Patient presents with   [facility] Pre-op Exam     R hip 06/17/19 at [facility] with Dr. Makayla London      No Known Allergies  Outpatient Medications Marked as Taking for the 5/31/19 encounter (Procedure visit) with NASRIN Flannery CNP   Medication Sig Dispense Refill    oxyCODONE-acetaminophen (PERCOCET) 5-325 MG per tablet Take 1 tablet by mouth every 6 hours as needed for Pain.  Prenatal Vit-Fe Fumarate-FA (PRENATAL VITAMIN PLUS LOW IRON) 27-1 MG TABS Take 1 tablet by mouth 2 times daily 60 tablet 1    triamcinolone (KENALOG) 0.1 % cream APPLY TOPICALLY TWICE A DAY 45 g 2    methocarbamol (ROBAXIN) 500 MG tablet TAKE ONE TABLET BY MOUTH DAILY 30 tablet 0    atenolol (TENORMIN) 50 MG tablet TAKE ONE TABLET BY MOUTH DAILY 90 tablet 1    diclofenac sodium 1 % GEL Apply 2 g topically 2 times daily 2 Tube 1    hydrocortisone 2.5 % cream APPLY TO THE FACE TWO TIMES A DAY AS NEEDED 28 g 2    lisinopril-hydrochlorothiazide (PRINZIDE;ZESTORETIC) 20-25 MG per tablet TAKE ONE TABLET BY MOUTH DAILY 90 tablet 1    naproxen (NAPROSYN) 250 MG tablet Take 1 tablet by mouth daily 60 tablet 0       HPI  This patient presents to the office today for a preoperative consultation at the request of surgeon, Dr. Makayla London, who plans on performing RIGHT TOTAL [place]  on June 17 2019 at [place] [address].  The current problem began 3 months ago, and symptoms have been worsening with time. Conservative therapy: N/A.     Planned anesthesia: General   Known anesthesia problems: None   Bleeding risk: No recent or remote history of abnormal bleeding  Personal or FH of DVT/PE: No    Patient objection to receiving blood products: No    Patient Active Problem List   Diagnosis    HTN (hypertension)    Eczema    Atopic dermatitis    Postinflammatory hyperpigmentation       Past Medical History:   Diagnosis Date    Eczema     Hypertension      History reviewed. No pertinent surgical history. Family History   Problem Relation Age of Onset    High Blood Pressure Mother     Other Mother         dementia       Review of Systems  A comprehensive review of systems was negative except for what was noted in the HPI. Physical Exam   Constitutional: She is oriented to person, place, and time. She appears well-developed and well-nourished. No distress. HENT:   Head: Normocephalic and atraumatic. Mouth/Throat: Uvula is midline, oropharynx is clear and moist and mucous membranes are normal.   Eyes: Conjunctivae and EOM are normal. Pupils are equal, round, and reactive to light. Neck: Trachea normal and normal range of motion. Neck supple. No JVD present. Carotid bruit is not present. No mass and no thyromegaly present. Cardiovascular: Normal rate, regular rhythm, normal heart sounds and intact distal pulses. Exam reveals no gallop and no friction rub. No murmur heard. Pulmonary/Chest: Effort normal and breath sounds normal. No respiratory distress. She has no wheezes. She has no rales. Abdominal: Soft. Normal aorta and bowel sounds are normal. She exhibits no distension and no mass. There is no hepatosplenomegaly. No tenderness. Musculoskeletal: right hip pain, walks with a cane and sits with a pillow. Neurological: She is alert and oriented to person, place, and time. She has normal strength. No cranial nerve deficit or sensory deficit. Coordination and gait normal.   Skin: Skin is warm and dry. No rash noted. No erythema.    Psychiatric: She has a normal mood and affect. Her behavior is normal.     EKG Interpretation:  Sinus rhythm- occasional ectopic ventricular beat, asymptomatic. Lab Review -   CBC with Differential:    Lab Results   Component Value Date    WBC 4.2 05/31/2019    RBC 4.72 05/31/2019    HGB 14.4 05/31/2019    HCT 43.4 05/31/2019     05/31/2019    MCV 91.9 05/31/2019    MCH 30.5 05/31/2019    MCHC 33.2 05/31/2019    RDW 14.0 05/31/2019    LYMPHOPCT 35.0 05/31/2019    MONOPCT 7.2 05/31/2019    BASOPCT 1.5 05/31/2019    MONOSABS 0.3 05/31/2019    LYMPHSABS 1.5 05/31/2019    EOSABS 0.1 05/31/2019    BASOSABS 0.1 05/31/2019     CMP:    Lab Results   Component Value Date     05/31/2019    K 4.2 05/31/2019     05/31/2019    CO2 27 05/31/2019    BUN 27 05/31/2019    CREATININE 0.6 05/31/2019    GFRAA >60 05/31/2019    AGRATIO 1.7 05/31/2019    LABGLOM >60 05/31/2019    GLUCOSE 89 05/31/2019    PROT 7.8 05/31/2019    LABALBU 4.9 05/31/2019    CALCIUM 9.8 05/31/2019    BILITOT 0.3 05/31/2019    ALKPHOS 103 05/31/2019    AST 18 05/31/2019    ALT 13 05/31/2019         Assessment:       60 y.o. patient with planned surgery as above. Known risk factors for perioperative complications: Hypertension  Current medications which may produce withdrawal symptoms if withheld perioperatively: none      Plan:     1. Preoperative workup as follows: EKG, CBC, CMP  2. Change in medication regimen before surgery:  Hold aspirin, ibuprofen, aleve, naprosyn, other NSAIDS, or products containing these medications for 7 days before surgery. Hold fish oil, and vitamin E  OK to take multiple vitamin  OK to take tylenol  Nothing to eat of drink after midnight before surgery. However Pt is instructed to follow Surgeons recommendation for preoperative medication regimen.       3. Prophylaxis for cardiac events with perioperative beta-blockers: Currently taking  metoprolol  ACC/AHA indications for pre-operative beta-blocker use:    · Vascular

## 2019-05-31 NOTE — PATIENT INSTRUCTIONS
a sip of water. · Follow the instructions about when to stop eating and drinking. If you don't, your surgery may be canceled. · Follow your doctor's instructions about when to bathe or shower before your surgery. · Do not shave the surgical site yourself. · Take off all jewelry and piercings. · Take out contact lenses, if you wear them. · Have a picture ID ready to take with you. Your ID will be checked before your surgery. · Know when to call your doctor. Call your doctor if you:  ? Become ill before surgery. ? Need to reschedule. ? Have changed your mind about having the surgery. What happens before surgery? Here are some things you can expect to happen before your surgery. · Your picture ID will be checked. · The area of your body that needs surgery is often marked to make sure there are no errors. · You will be kept comfortable and safe by your anesthesia provider. The anesthesia may make you sleep. Or it may just numb the area being worked on. What happens when you are ready to go home? Be sure you have someone drive you home. Anesthesia and pain medicine make it unsafe for you to drive. You will get instructions about recovering from your surgery. This is called a discharge plan. It will cover things like diet, wound care, follow-up care, driving, and getting back to your normal routine. Follow-up care is a key part of your treatment and safety. Be sure to make and go to all appointments, and call your doctor if you are having problems. It's also a good idea to know your test results and keep a list of the medicines you take. Where can you learn more? Go to [email or web address]. org and sign in to your [employer] account. Enter Q270 in the [facility] box to learn more about \"Learning About How to Prepare for Surgery. \"     If you do not have an account, please click on the \"Sign Up Now\" link.   Current as of: December 13, 2018  Content Version: 12.0  © 0644-0313 [employer]. Care instructions adapted under license by [facility] ([facility]). If you have questions about a medical condition or this instruction, always ask your healthcare professional. [address] any warranty or liability for your use of this information.

## 2019-05-31 NOTE — LETTER
[address] [address]  Phone: [phone]  Fax: 192 [facility], APRN - CNP        May 31, 2019     Patient: Tiana Goldberg   YOB: 1960   Date of Visit: 5/31/2019       To Whom It May Concern: It is my medical opinion that Tiana Goldberg Pt would benefit from being of work Mei 10 prior to surgery, as she will be off pain medications 7 days prior . Off Mei 10-June 17th    If you have any questions or concerns, please don't hesitate to call.     Sincerely,          Twyla Cabral, NASRIN - CNP

## 2019-06-10 ENCOUNTER — TELEPHONE (OUTPATIENT)
Dept: ORTHOPEDIC SURGERY | Age: 59
End: 2019-06-10

## 2019-06-10 DIAGNOSIS — M16.11 PRIMARY OSTEOARTHRITIS OF RIGHT HIP: Primary | ICD-10-CM

## 2019-06-10 RX ORDER — OXYCODONE HYDROCHLORIDE AND ACETAMINOPHEN 5; 325 MG/1; MG/1
1 TABLET ORAL EVERY 6 HOURS PRN
Qty: 28 TABLET | Refills: 0 | Status: ON HOLD | OUTPATIENT
Start: 2019-06-10 | End: 2019-06-18 | Stop reason: HOSPADM

## 2019-06-10 NOTE — PROGRESS NOTES
[facility] / [facility] ([facility]) [address] [place] [employer], [address]    Acknowledgment of Informed Consent for Surgical or Medical Procedure and Sedation  I agree to allow doctor(s) CHAVA RAMIREZ and his/her associates or assistants, including residents and/or other qualified medical practitioner to perform the following medical treatment or procedure and to administer or direct the administration of sedation as necessary:  Procedure(s): RIGHT TOTAL HIP [address]  My doctor has explained the following regarding the proposed procedure:   the explanation of the procedure   the benefits of the procedure   the potential problems that might occur during recuperation   the risks and side effects of the procedure which could include but are not limited to severe blood loss, infection, stroke or death   the benefits, risks and side effect of alternative procedures including the consequences of declining this procedure or any alternative procedures   the likelihood of achieving satisfactory results. I acknowledge no guarantee or assurance has been made to me regarding the results. I understand that during the course of this treatment/procedure, unforeseen conditions can occur which require an additional or different procedure. I agree to allow my physician or assistants to perform such extension of the original procedure as they may find necessary. I understand that sedation will often result in temporary impairment of memory and fine motor skills and that sedation can occasionally progress to a state of deep sedation or general anesthesia. I understand the risks of anesthesia for surgery include, but are not limited to, sore throat, hoarseness, injury to face, mouth, or teeth; nausea; headache; injury to blood vessels or nerves; death, brain damage, or paralysis.     I understand that if I have a Limitation of Treatment order in effect during my hospitalization, the order may or may not be in effect during this procedure. I give my doctor permission to give me blood or blood products. I understand that there are risks with receiving blood such as hepatitis, AIDS, fever, or allergic reaction. I acknowledge that the risks, benefits, and alternatives of this treatment have been explained to me and that no express or implied warranty has been given by the hospital, any blood bank, or any person or entity as to the blood or blood components transfused. At the discretion of my doctor, I agree to allow observers, equipment/product representatives and allow photographing, and/or televising of the procedure, provided my name or identity is maintained confidentially. I agree the hospital may dispose of or use for scientific or educational purposes any tissue, fluid, or body parts which may be removed.     ________________________________Date________Time______ am/pm  ([facility])  Patient or Signature of Closest Relative or Legal Guardian    ________________________________Date________Time______am/pm      Page 1 of  1  Witness

## 2019-06-11 ENCOUNTER — TELEPHONE (OUTPATIENT)
Dept: ORTHOPEDIC SURGERY | Age: 59
End: 2019-06-11

## 2019-06-11 RX ORDER — CEFAZOLIN SODIUM 2 G/50ML
2 SOLUTION INTRAVENOUS ONCE
Status: CANCELLED | OUTPATIENT
Start: 2019-06-17 | End: 2019-06-11

## 2019-06-11 RX ORDER — PREGABALIN 150 MG/1
150 CAPSULE ORAL ONCE
Status: CANCELLED | OUTPATIENT
Start: 2019-06-17

## 2019-06-11 RX ORDER — DEXAMETHASONE SODIUM PHOSPHATE 4 MG/ML
10 INJECTION, SOLUTION INTRA-ARTICULAR; INTRALESIONAL; INTRAMUSCULAR; INTRAVENOUS; SOFT TISSUE ONCE
Status: CANCELLED | OUTPATIENT
Start: 2019-06-17

## 2019-06-11 RX ORDER — ACETAMINOPHEN 10 MG/ML
750 INJECTION, SOLUTION INTRAVENOUS ONCE
Status: CANCELLED | OUTPATIENT
Start: 2019-06-17

## 2019-06-11 RX ORDER — CELECOXIB 100 MG/1
400 CAPSULE ORAL ONCE
Status: CANCELLED | OUTPATIENT
Start: 2019-06-17

## 2019-06-11 RX ORDER — OXYCODONE HCL 10 MG/1
20 TABLET, FILM COATED, EXTENDED RELEASE ORAL ONCE
Status: CANCELLED | OUTPATIENT
Start: 2019-06-17

## 2019-06-12 ENCOUNTER — HOSPITAL ENCOUNTER (OUTPATIENT)
Dept: PREADMISSION TESTING | Age: 59
Discharge: HOME | End: 2019-06-16
Payer: COMMERCIAL

## 2019-06-12 VITALS
RESPIRATION RATE: 20 BRPM | BODY MASS INDEX: 32.87 KG/M2 | SYSTOLIC BLOOD PRESSURE: 168 MMHG | WEIGHT: 178.6 LBS | OXYGEN SATURATION: 97 % | TEMPERATURE: 97.6 F | HEIGHT: 62 IN | DIASTOLIC BLOOD PRESSURE: 79 MMHG | HEART RATE: 62 BPM

## 2019-06-12 LAB
ABO/RH: NORMAL
ANTIBODY SCREEN: NORMAL
APTT: 36.4 SEC (ref 26–36)
C-REACTIVE PROTEIN: 1.3 MG/L (ref 0–5.1)
ESTIMATED AVERAGE GLUCOSE: 99.7 MG/DL
HBA1C MFR BLD: 5.1 %
INR BLD: 1.03 (ref 0.86–1.14)
MRSA SCREEN RT-PCR: NORMAL
PROTHROMBIN TIME: 11.7 SEC (ref 9.8–13)
SEDIMENTATION RATE, ERYTHROCYTE: 8 MM/HR (ref 0–30)

## 2019-06-12 PROCEDURE — 85652 RBC SED RATE AUTOMATED: CPT

## 2019-06-12 PROCEDURE — 86901 BLOOD TYPING SEROLOGIC RH(D): CPT

## 2019-06-12 PROCEDURE — 86140 C-REACTIVE PROTEIN: CPT

## 2019-06-12 PROCEDURE — 85730 THROMBOPLASTIN TIME PARTIAL: CPT

## 2019-06-12 PROCEDURE — 86850 RBC ANTIBODY SCREEN: CPT

## 2019-06-12 PROCEDURE — 85610 PROTHROMBIN TIME: CPT

## 2019-06-12 PROCEDURE — 86900 BLOOD TYPING SEROLOGIC ABO: CPT

## 2019-06-12 PROCEDURE — 83036 HEMOGLOBIN GLYCOSYLATED A1C: CPT

## 2019-06-12 PROCEDURE — 87641 MR-STAPH DNA AMP PROBE: CPT

## 2019-06-12 NOTE — PROGRESS NOTES
[address] [employer]                          Date of Procedure 6/17/19 Time of Procedure 0930    PRIOR TO PROCEDURE DATE:  1. Please follow any guidelines/instructions prior to your procedure as advised by your surgeon. 2. Arrange for someone to drive you home and be with you for the first 24 hours after discharge for your safety after your procedure for which you received sedation. Ensure it is someone we can share information with regarding your discharge. 3. You must contact your surgeon for instructions IF:   You are taking any blood thinners, aspirin, anti-inflammatory or vitamin E.   There is a change in your physical condition such as a cold, fever, rash, cuts, sores or any other infection, especially near your surgical site. 4. Do not drink alcohol the day before or day of your procedure. 5. A Pre-op History and Physical for surgery MUST be completed by your Physician or Urgent Care within 30 days of your procedure date. Please bring a copy with you on the day of your procedure and along with any other testing performed. THE DAY OF YOUR PROCEDURE:  1. Follow instructions for ARRIVAL TIME as DIRECTED BY YOUR SURGEON. If your surgeon does not give you a specific arrival time, please arrive at [facility] . 2. Enter the MAIN entrance from [address] and follow the signs to the free [employer] or [employer] parking (offered free of charge 6am-5pm). 3. Enter the Main Entrance of the hospital (do NOT enter from the lower level of the parking garage). Upon entrance, check in with the  at the main desk on your left. If no one is available at the desk, proceed into the [facility] Waiting Room and go through the door directly into the [facility]. There is a Check-in desk ACROSS from Room 5 (marked with a sign hanging from the ceiling).  The phone number for the surgery center is [phone].    4. DO NOT EAT ANYTHING hospital room. 12. If you have a Living Will or Durable Power of , please bring a copy on the day of your procedure. 15.  With your permission, one family member may accompany you while you are being prepared for surgery. Once you are ready, additional family members may join you. HOW WE KEEP YOU SAFE and WORK TO PREVENT SURGICAL SITE INFECTIONS:  1. Health care workers should always check your ID bracelet to verify your name and birth date. You will be asked many times to state your name, date of birth, and allergies. 2. Health care workers should always clean their hands with soap or alcohol gel before providing care to you. It is okay to ask anyone if they cleaned their hands before they touch you. 3. You will be actively involved in verifying the type of procedure you are having and ensuring the correct surgical site. This will be confirmed multiple times prior to your procedure. Do NOT romain your surgery site UNLESS instructed to by your surgeon. 4. Do not shave or wax for 72 hours prior to procedure near your operative site. Shaving with a razor can irritate your skin and make it easier to develop an infection. On the day of your procedure, any hair that needs to be removed near the surgical site will be clipped by a healthcare worker using a special clippers designed to avoid skin irritation. 5. When you are in the operating room, your surgical site will be cleansed with a special soap, and in most cases, you will be given an antibiotic before the surgery begins. What to expect AFTER YOUR PROCEDURE:  1. Immediately following your procedure, your will be taken to the PACU for the first phase of your recovery. Your nurse will help you recover from any potential side effects of anesthesia, such as extreme drowsiness, changes in your vital signs or breathing patterns. Nausea, headache, muscle aches, or sore throat may also occur after anesthesia.   Your nurse will help you DAY OF YOUR SURGERY. No Your Guide to Shoulder Replacement Surgery. PLEASE BRING THIS BOOKLET BACK ON THE DAY OF YOUR SURGERY.   Yes  Reviewed/Given handout for [employer] Video/Class    No Other

## 2019-06-12 NOTE — PROGRESS NOTES
Snoring? Do you snore loudly (loud enough to be heard through closed doors, or your bed partner elbows you for snoring at night)? No    Tired? Do you often feel tired, fatigued, or sleepy during the daytime (such as falling asleep during driving)? No    Observed? Has anyone observed you stop breathing or choking/gasping during your sleep? No    Pressure? Do you have or are being treated for high blood pressure? Yes    Neck Size? (measured around Lds apple)  For male, is your shirt collar 17 inches or larger? For female, is your shirt collar 16 inches or larger? Yes    Age older than 48years old? Yes    Gender = Male  No    Body Mass Index more than 35 kg/m2? No    Risk of KRYSTA Scoring criteria:    [] Low risk:  Yes to 0 - 2 questions    [x] Intermediate risk:  Yes to 3 - 4 questions    [] High risk:  Yes to 5 - 8 questions     Results called to OR Scheduling?   No

## 2019-06-14 ENCOUNTER — ANESTHESIA EVENT (OUTPATIENT)
Dept: OPERATING ROOM | Age: 59
DRG: 470 | End: 2019-06-14
Payer: COMMERCIAL

## 2019-06-17 ENCOUNTER — APPOINTMENT (OUTPATIENT)
Dept: GENERAL RADIOLOGY | Age: 59
DRG: 470 | End: 2019-06-17
Attending: ORTHOPAEDIC SURGERY
Payer: COMMERCIAL

## 2019-06-17 ENCOUNTER — ANESTHESIA (OUTPATIENT)
Dept: OPERATING ROOM | Age: 59
DRG: 470 | End: 2019-06-17
Payer: COMMERCIAL

## 2019-06-17 ENCOUNTER — HOSPITAL ENCOUNTER (INPATIENT)
Age: 59
LOS: 2 days | Discharge: HOME HEALTH | DRG: 470 | End: 2019-06-19
Attending: ORTHOPAEDIC SURGERY | Admitting: ORTHOPAEDIC SURGERY
Payer: COMMERCIAL

## 2019-06-17 VITALS — DIASTOLIC BLOOD PRESSURE: 55 MMHG | SYSTOLIC BLOOD PRESSURE: 102 MMHG | OXYGEN SATURATION: 100 % | TEMPERATURE: 96.6 F

## 2019-06-17 DIAGNOSIS — Z96.641 S/P HIP REPLACEMENT, RIGHT: Primary | ICD-10-CM

## 2019-06-17 PROBLEM — M16.11 OSTEOARTHRITIS OF RIGHT HIP: Status: ACTIVE | Noted: 2019-06-17

## 2019-06-17 LAB
ABO/RH: NORMAL
ANTIBODY SCREEN: NORMAL
INR BLD: 1.01 (ref 0.86–1.14)
PROTHROMBIN TIME: 11.5 SEC (ref 9.8–13)

## 2019-06-17 PROCEDURE — 6370000000 HC RX 637 (ALT 250 FOR IP): Performed by: INTERNAL MEDICINE

## 2019-06-17 PROCEDURE — 2500000003 HC RX 250 WO HCPCS: Performed by: NURSE ANESTHETIST, CERTIFIED REGISTERED

## 2019-06-17 PROCEDURE — 27130 TOTAL HIP ARTHROPLASTY: CPT | Performed by: PHYSICIAN ASSISTANT

## 2019-06-17 PROCEDURE — 2580000003 HC RX 258: Performed by: ANESTHESIOLOGY

## 2019-06-17 PROCEDURE — 27130 TOTAL HIP ARTHROPLASTY: CPT | Performed by: ORTHOPAEDIC SURGERY

## 2019-06-17 PROCEDURE — 6370000000 HC RX 637 (ALT 250 FOR IP): Performed by: ORTHOPAEDIC SURGERY

## 2019-06-17 PROCEDURE — 3600000005 HC SURGERY LEVEL 5 BASE: Performed by: ORTHOPAEDIC SURGERY

## 2019-06-17 PROCEDURE — 6360000002 HC RX W HCPCS: Performed by: PHYSICIAN ASSISTANT

## 2019-06-17 PROCEDURE — 2580000003 HC RX 258: Performed by: ORTHOPAEDIC SURGERY

## 2019-06-17 PROCEDURE — 2500000003 HC RX 250 WO HCPCS: Performed by: ORTHOPAEDIC SURGERY

## 2019-06-17 PROCEDURE — 6370000000 HC RX 637 (ALT 250 FOR IP): Performed by: PHYSICIAN ASSISTANT

## 2019-06-17 PROCEDURE — 86850 RBC ANTIBODY SCREEN: CPT

## 2019-06-17 PROCEDURE — C1776 JOINT DEVICE (IMPLANTABLE): HCPCS | Performed by: ORTHOPAEDIC SURGERY

## 2019-06-17 PROCEDURE — 85610 PROTHROMBIN TIME: CPT

## 2019-06-17 PROCEDURE — 1200000000 HC SEMI PRIVATE

## 2019-06-17 PROCEDURE — 2580000003 HC RX 258: Performed by: PHYSICIAN ASSISTANT

## 2019-06-17 PROCEDURE — 6360000002 HC RX W HCPCS: Performed by: ORTHOPAEDIC SURGERY

## 2019-06-17 PROCEDURE — 2720000010 HC SURG SUPPLY STERILE: Performed by: ORTHOPAEDIC SURGERY

## 2019-06-17 PROCEDURE — 7100000001 HC PACU RECOVERY - ADDTL 15 MIN: Performed by: ORTHOPAEDIC SURGERY

## 2019-06-17 PROCEDURE — 6360000002 HC RX W HCPCS: Performed by: ANESTHESIOLOGY

## 2019-06-17 PROCEDURE — 7100000000 HC PACU RECOVERY - FIRST 15 MIN: Performed by: ORTHOPAEDIC SURGERY

## 2019-06-17 PROCEDURE — 3600000015 HC SURGERY LEVEL 5 ADDTL 15MIN: Performed by: ORTHOPAEDIC SURGERY

## 2019-06-17 PROCEDURE — 73502 X-RAY EXAM HIP UNI 2-3 VIEWS: CPT

## 2019-06-17 PROCEDURE — 3700000000 HC ANESTHESIA ATTENDED CARE: Performed by: ORTHOPAEDIC SURGERY

## 2019-06-17 PROCEDURE — 86901 BLOOD TYPING SEROLOGIC RH(D): CPT

## 2019-06-17 PROCEDURE — 3700000001 HC ADD 15 MINUTES (ANESTHESIA): Performed by: ORTHOPAEDIC SURGERY

## 2019-06-17 PROCEDURE — 86900 BLOOD TYPING SEROLOGIC ABO: CPT

## 2019-06-17 PROCEDURE — 6360000002 HC RX W HCPCS: Performed by: NURSE ANESTHETIST, CERTIFIED REGISTERED

## 2019-06-17 PROCEDURE — 76942 ECHO GUIDE FOR BIOPSY: CPT | Performed by: ANESTHESIOLOGY

## 2019-06-17 PROCEDURE — 2709999900 HC NON-CHARGEABLE SUPPLY: Performed by: ORTHOPAEDIC SURGERY

## 2019-06-17 PROCEDURE — 3209999900 FLUORO FOR SURGICAL PROCEDURES

## 2019-06-17 PROCEDURE — 0SR90JZ REPLACEMENT OF RIGHT HIP JOINT WITH SYNTHETIC SUBSTITUTE, OPEN APPROACH: ICD-10-PCS | Performed by: ORTHOPAEDIC SURGERY

## 2019-06-17 DEVICE — REFLECTION SPHERICAL HEAD SCREW 25MM
Type: IMPLANTABLE DEVICE | Site: HIP | Status: FUNCTIONAL
Brand: REFLECTION

## 2019-06-17 DEVICE — OXINIUM FEMORAL HEAD 12/14 TAPER                                    36 MM -3
Type: IMPLANTABLE DEVICE | Site: HIP | Status: FUNCTIONAL
Brand: OXINIUM

## 2019-06-17 DEVICE — R3 3 HOLE ACETABULAR SHELL 52MM
Type: IMPLANTABLE DEVICE | Site: HIP | Status: FUNCTIONAL
Brand: R3 ACETABULAR

## 2019-06-17 DEVICE — ANTHOLOGY STANDARD OFFSET POROUS                                    PLUS HA SIZE 5
Type: IMPLANTABLE DEVICE | Site: HIP | Status: FUNCTIONAL
Brand: ANTHOLOGY

## 2019-06-17 DEVICE — REFLECTION SPHERICAL HEAD SCREW 35MM
Type: IMPLANTABLE DEVICE | Site: HIP | Status: FUNCTIONAL
Brand: REFLECTION

## 2019-06-17 DEVICE — R3 20 DEGREE +4 XLPE ACETABULAR                                    LINER 36MM ID X OD 52MM
Type: IMPLANTABLE DEVICE | Site: HIP | Status: FUNCTIONAL
Brand: R3

## 2019-06-17 DEVICE — IMPLANTABLE DEVICE: Type: IMPLANTABLE DEVICE | Site: HIP | Status: FUNCTIONAL

## 2019-06-17 RX ORDER — ONDANSETRON 2 MG/ML
INJECTION INTRAMUSCULAR; INTRAVENOUS PRN
Status: DISCONTINUED | OUTPATIENT
Start: 2019-06-17 | End: 2019-06-17 | Stop reason: SDUPTHER

## 2019-06-17 RX ORDER — MIDAZOLAM HYDROCHLORIDE 1 MG/ML
INJECTION INTRAMUSCULAR; INTRAVENOUS PRN
Status: DISCONTINUED | OUTPATIENT
Start: 2019-06-17 | End: 2019-06-17 | Stop reason: SDUPTHER

## 2019-06-17 RX ORDER — DOCUSATE SODIUM 100 MG/1
100 CAPSULE, LIQUID FILLED ORAL 2 TIMES DAILY
Status: DISCONTINUED | OUTPATIENT
Start: 2019-06-17 | End: 2019-06-19 | Stop reason: HOSPADM

## 2019-06-17 RX ORDER — SODIUM CHLORIDE 0.9 % (FLUSH) 0.9 %
10 SYRINGE (ML) INJECTION PRN
Status: DISCONTINUED | OUTPATIENT
Start: 2019-06-17 | End: 2019-06-19 | Stop reason: HOSPADM

## 2019-06-17 RX ORDER — GLYCOPYRROLATE 1 MG/5 ML
SYRINGE (ML) INTRAVENOUS PRN
Status: DISCONTINUED | OUTPATIENT
Start: 2019-06-17 | End: 2019-06-17 | Stop reason: SDUPTHER

## 2019-06-17 RX ORDER — TRIAMCINOLONE ACETONIDE 1 MG/G
CREAM TOPICAL 2 TIMES DAILY
Status: DISCONTINUED | OUTPATIENT
Start: 2019-06-17 | End: 2019-06-19 | Stop reason: HOSPADM

## 2019-06-17 RX ORDER — CHLORAL HYDRATE 500 MG
1000 CAPSULE ORAL DAILY
Status: DISCONTINUED | OUTPATIENT
Start: 2019-06-17 | End: 2019-06-17

## 2019-06-17 RX ORDER — SODIUM CHLORIDE 450 MG/100ML
INJECTION, SOLUTION INTRAVENOUS CONTINUOUS
Status: DISCONTINUED | OUTPATIENT
Start: 2019-06-17 | End: 2019-06-18 | Stop reason: ALTCHOICE

## 2019-06-17 RX ORDER — MIDAZOLAM HYDROCHLORIDE 1 MG/ML
2 INJECTION INTRAMUSCULAR; INTRAVENOUS ONCE
Status: COMPLETED | OUTPATIENT
Start: 2019-06-17 | End: 2019-06-17

## 2019-06-17 RX ORDER — FENTANYL CITRATE 50 UG/ML
100 INJECTION, SOLUTION INTRAMUSCULAR; INTRAVENOUS ONCE
Status: COMPLETED | OUTPATIENT
Start: 2019-06-17 | End: 2019-06-17

## 2019-06-17 RX ORDER — PROPOFOL 10 MG/ML
INJECTION, EMULSION INTRAVENOUS PRN
Status: DISCONTINUED | OUTPATIENT
Start: 2019-06-17 | End: 2019-06-17 | Stop reason: SDUPTHER

## 2019-06-17 RX ORDER — PROMETHAZINE HYDROCHLORIDE 25 MG/ML
6.25 INJECTION, SOLUTION INTRAMUSCULAR; INTRAVENOUS
Status: DISCONTINUED | OUTPATIENT
Start: 2019-06-17 | End: 2019-06-17 | Stop reason: HOSPADM

## 2019-06-17 RX ORDER — SODIUM CHLORIDE 0.9 % (FLUSH) 0.9 %
10 SYRINGE (ML) INJECTION PRN
Status: DISCONTINUED | OUTPATIENT
Start: 2019-06-17 | End: 2019-06-17 | Stop reason: HOSPADM

## 2019-06-17 RX ORDER — ROPIVACAINE HYDROCHLORIDE 5 MG/ML
INJECTION, SOLUTION EPIDURAL; INFILTRATION; PERINEURAL
Status: COMPLETED | OUTPATIENT
Start: 2019-06-17 | End: 2019-06-17

## 2019-06-17 RX ORDER — SODIUM CHLORIDE 0.9 % (FLUSH) 0.9 %
10 SYRINGE (ML) INJECTION EVERY 12 HOURS SCHEDULED
Status: DISCONTINUED | OUTPATIENT
Start: 2019-06-17 | End: 2019-06-17 | Stop reason: HOSPADM

## 2019-06-17 RX ORDER — CEFAZOLIN SODIUM 2 G/50ML
2 SOLUTION INTRAVENOUS ONCE
Status: COMPLETED | OUTPATIENT
Start: 2019-06-17 | End: 2019-06-17

## 2019-06-17 RX ORDER — CALCIUM CARBONATE 500(1250)
500 TABLET ORAL 2 TIMES DAILY
Status: DISCONTINUED | OUTPATIENT
Start: 2019-06-17 | End: 2019-06-19 | Stop reason: HOSPADM

## 2019-06-17 RX ORDER — NEOSTIGMINE METHYLSULFATE 5 MG/5 ML
SYRINGE (ML) INTRAVENOUS PRN
Status: DISCONTINUED | OUTPATIENT
Start: 2019-06-17 | End: 2019-06-17 | Stop reason: SDUPTHER

## 2019-06-17 RX ORDER — CALCIUM CARBONATE 200(500)MG
1000 TABLET,CHEWABLE ORAL 3 TIMES DAILY PRN
Status: DISCONTINUED | OUTPATIENT
Start: 2019-06-17 | End: 2019-06-19 | Stop reason: HOSPADM

## 2019-06-17 RX ORDER — FENTANYL CITRATE 50 UG/ML
50 INJECTION, SOLUTION INTRAMUSCULAR; INTRAVENOUS EVERY 5 MIN PRN
Status: DISCONTINUED | OUTPATIENT
Start: 2019-06-17 | End: 2019-06-17 | Stop reason: HOSPADM

## 2019-06-17 RX ORDER — FENTANYL CITRATE 50 UG/ML
25 INJECTION, SOLUTION INTRAMUSCULAR; INTRAVENOUS EVERY 5 MIN PRN
Status: DISCONTINUED | OUTPATIENT
Start: 2019-06-17 | End: 2019-06-17 | Stop reason: HOSPADM

## 2019-06-17 RX ORDER — LIDOCAINE HYDROCHLORIDE 10 MG/ML
1 INJECTION, SOLUTION EPIDURAL; INFILTRATION; INTRACAUDAL; PERINEURAL
Status: DISCONTINUED | OUTPATIENT
Start: 2019-06-17 | End: 2019-06-17 | Stop reason: HOSPADM

## 2019-06-17 RX ORDER — VITAMIN A, VITAMIN C, VITAMIN D-3, VITAMIN E, VITAMIN B-1, VITAMIN B-2, NIACIN, VITAMIN B-6, CALCIUM, IRON, ZINC, COPPER 4000; 120; 400; 22; 1.84; 3; 20; 10; 1; 12; 200; 27; 25; 2 [IU]/1; MG/1; [IU]/1; MG/1; MG/1; MG/1; MG/1; MG/1; MG/1; UG/1; MG/1; MG/1; MG/1; MG/1
1 TABLET ORAL 2 TIMES DAILY
Status: DISCONTINUED | OUTPATIENT
Start: 2019-06-17 | End: 2019-06-19 | Stop reason: HOSPADM

## 2019-06-17 RX ORDER — OXYCODONE HYDROCHLORIDE AND ACETAMINOPHEN 5; 325 MG/1; MG/1
1 TABLET ORAL EVERY 4 HOURS PRN
Status: DISCONTINUED | OUTPATIENT
Start: 2019-06-17 | End: 2019-06-19 | Stop reason: HOSPADM

## 2019-06-17 RX ORDER — ROCURONIUM BROMIDE 10 MG/ML
INJECTION, SOLUTION INTRAVENOUS PRN
Status: DISCONTINUED | OUTPATIENT
Start: 2019-06-17 | End: 2019-06-17 | Stop reason: SDUPTHER

## 2019-06-17 RX ORDER — DEXAMETHASONE SODIUM PHOSPHATE 4 MG/ML
INJECTION, SOLUTION INTRA-ARTICULAR; INTRALESIONAL; INTRAMUSCULAR; INTRAVENOUS; SOFT TISSUE PRN
Status: DISCONTINUED | OUTPATIENT
Start: 2019-06-17 | End: 2019-06-17 | Stop reason: SDUPTHER

## 2019-06-17 RX ORDER — FENTANYL CITRATE 50 UG/ML
INJECTION, SOLUTION INTRAMUSCULAR; INTRAVENOUS PRN
Status: DISCONTINUED | OUTPATIENT
Start: 2019-06-17 | End: 2019-06-17 | Stop reason: SDUPTHER

## 2019-06-17 RX ORDER — SODIUM CHLORIDE, SODIUM LACTATE, POTASSIUM CHLORIDE, CALCIUM CHLORIDE 600; 310; 30; 20 MG/100ML; MG/100ML; MG/100ML; MG/100ML
INJECTION, SOLUTION INTRAVENOUS CONTINUOUS
Status: DISCONTINUED | OUTPATIENT
Start: 2019-06-17 | End: 2019-06-17

## 2019-06-17 RX ORDER — LIDOCAINE HYDROCHLORIDE 20 MG/ML
INJECTION, SOLUTION INTRAVENOUS PRN
Status: DISCONTINUED | OUTPATIENT
Start: 2019-06-17 | End: 2019-06-17 | Stop reason: SDUPTHER

## 2019-06-17 RX ORDER — ACETAMINOPHEN 10 MG/ML
750 INJECTION, SOLUTION INTRAVENOUS ONCE
Status: DISCONTINUED | OUTPATIENT
Start: 2019-06-17 | End: 2019-06-17 | Stop reason: HOSPADM

## 2019-06-17 RX ORDER — LISINOPRIL AND HYDROCHLOROTHIAZIDE 25; 20 MG/1; MG/1
1 TABLET ORAL DAILY
Status: DISCONTINUED | OUTPATIENT
Start: 2019-06-17 | End: 2019-06-19 | Stop reason: HOSPADM

## 2019-06-17 RX ORDER — VANCOMYCIN HYDROCHLORIDE 1 G/20ML
INJECTION, POWDER, LYOPHILIZED, FOR SOLUTION INTRAVENOUS PRN
Status: DISCONTINUED | OUTPATIENT
Start: 2019-06-17 | End: 2019-06-17 | Stop reason: ALTCHOICE

## 2019-06-17 RX ORDER — PREDNISONE 10 MG/1
10 TABLET ORAL DAILY
Status: DISCONTINUED | OUTPATIENT
Start: 2019-06-17 | End: 2019-06-19 | Stop reason: HOSPADM

## 2019-06-17 RX ORDER — CELECOXIB 100 MG/1
400 CAPSULE ORAL ONCE
Status: COMPLETED | OUTPATIENT
Start: 2019-06-17 | End: 2019-06-17

## 2019-06-17 RX ORDER — SODIUM CHLORIDE 0.9 % (FLUSH) 0.9 %
10 SYRINGE (ML) INJECTION EVERY 12 HOURS SCHEDULED
Status: DISCONTINUED | OUTPATIENT
Start: 2019-06-17 | End: 2019-06-19 | Stop reason: HOSPADM

## 2019-06-17 RX ORDER — ONDANSETRON 2 MG/ML
4 INJECTION INTRAMUSCULAR; INTRAVENOUS EVERY 6 HOURS PRN
Status: DISCONTINUED | OUTPATIENT
Start: 2019-06-17 | End: 2019-06-19 | Stop reason: HOSPADM

## 2019-06-17 RX ORDER — METHOCARBAMOL 500 MG/1
500 TABLET, FILM COATED ORAL DAILY
Status: DISCONTINUED | OUTPATIENT
Start: 2019-06-17 | End: 2019-06-19 | Stop reason: HOSPADM

## 2019-06-17 RX ORDER — OXYCODONE HYDROCHLORIDE AND ACETAMINOPHEN 5; 325 MG/1; MG/1
2 TABLET ORAL EVERY 4 HOURS PRN
Status: DISCONTINUED | OUTPATIENT
Start: 2019-06-17 | End: 2019-06-19 | Stop reason: HOSPADM

## 2019-06-17 RX ORDER — ATENOLOL 50 MG/1
50 TABLET ORAL DAILY
Status: DISCONTINUED | OUTPATIENT
Start: 2019-06-17 | End: 2019-06-19 | Stop reason: HOSPADM

## 2019-06-17 RX ORDER — ACETAMINOPHEN 325 MG/1
650 TABLET ORAL EVERY 4 HOURS PRN
Status: DISCONTINUED | OUTPATIENT
Start: 2019-06-17 | End: 2019-06-19 | Stop reason: HOSPADM

## 2019-06-17 RX ORDER — ROPIVACAINE HYDROCHLORIDE 5 MG/ML
50 INJECTION, SOLUTION EPIDURAL; INFILTRATION; PERINEURAL ONCE
Status: DISCONTINUED | OUTPATIENT
Start: 2019-06-17 | End: 2019-06-17 | Stop reason: HOSPADM

## 2019-06-17 RX ORDER — NAPROXEN 500 MG/1
250 TABLET ORAL DAILY
Status: DISCONTINUED | OUTPATIENT
Start: 2019-06-17 | End: 2019-06-19 | Stop reason: HOSPADM

## 2019-06-17 RX ORDER — DEXAMETHASONE SODIUM PHOSPHATE 4 MG/ML
10 INJECTION, SOLUTION INTRA-ARTICULAR; INTRALESIONAL; INTRAMUSCULAR; INTRAVENOUS; SOFT TISSUE ONCE
Status: COMPLETED | OUTPATIENT
Start: 2019-06-17 | End: 2019-06-17

## 2019-06-17 RX ORDER — SUCCINYLCHOLINE CHLORIDE 20 MG/ML
INJECTION INTRAMUSCULAR; INTRAVENOUS PRN
Status: DISCONTINUED | OUTPATIENT
Start: 2019-06-17 | End: 2019-06-17 | Stop reason: SDUPTHER

## 2019-06-17 RX ORDER — ERGOCALCIFEROL 1.25 MG/1
50000 CAPSULE ORAL WEEKLY
Status: DISCONTINUED | OUTPATIENT
Start: 2019-06-24 | End: 2019-06-19 | Stop reason: HOSPADM

## 2019-06-17 RX ORDER — PREGABALIN 150 MG/1
150 CAPSULE ORAL ONCE
Status: COMPLETED | OUTPATIENT
Start: 2019-06-17 | End: 2019-06-17

## 2019-06-17 RX ORDER — OXYCODONE HCL 10 MG/1
20 TABLET, FILM COATED, EXTENDED RELEASE ORAL ONCE
Status: COMPLETED | OUTPATIENT
Start: 2019-06-17 | End: 2019-06-17

## 2019-06-17 RX ORDER — ONDANSETRON 2 MG/ML
4 INJECTION INTRAMUSCULAR; INTRAVENOUS
Status: DISCONTINUED | OUTPATIENT
Start: 2019-06-17 | End: 2019-06-17 | Stop reason: HOSPADM

## 2019-06-17 RX ADMIN — DOCUSATE SODIUM 100 MG: 100 CAPSULE, LIQUID FILLED ORAL at 21:51

## 2019-06-17 RX ADMIN — FENTANYL CITRATE 50 MCG: 50 INJECTION INTRAMUSCULAR; INTRAVENOUS at 10:22

## 2019-06-17 RX ADMIN — Medication 0.8 MG: at 11:46

## 2019-06-17 RX ADMIN — OXYCODONE HYDROCHLORIDE AND ACETAMINOPHEN 2 TABLET: 5; 325 TABLET ORAL at 21:51

## 2019-06-17 RX ADMIN — PROPOFOL 200 MG: 10 INJECTION, EMULSION INTRAVENOUS at 09:31

## 2019-06-17 RX ADMIN — FENTANYL CITRATE 50 MCG: 50 INJECTION INTRAMUSCULAR; INTRAVENOUS at 10:58

## 2019-06-17 RX ADMIN — LIDOCAINE HYDROCHLORIDE 60 MG: 20 INJECTION, SOLUTION INTRAVENOUS at 09:31

## 2019-06-17 RX ADMIN — Medication 5 MG: at 11:46

## 2019-06-17 RX ADMIN — PREDNISONE 10 MG: 10 TABLET ORAL at 17:59

## 2019-06-17 RX ADMIN — HYDROMORPHONE HYDROCHLORIDE 0.5 MG: 1 INJECTION, SOLUTION INTRAMUSCULAR; INTRAVENOUS; SUBCUTANEOUS at 13:09

## 2019-06-17 RX ADMIN — OXYCODONE HYDROCHLORIDE AND ACETAMINOPHEN 2 TABLET: 5; 325 TABLET ORAL at 17:58

## 2019-06-17 RX ADMIN — OXYCODONE HYDROCHLORIDE 20 MG: 10 TABLET, FILM COATED, EXTENDED RELEASE ORAL at 07:10

## 2019-06-17 RX ADMIN — SUCCINYLCHOLINE CHLORIDE 160 MG: 20 INJECTION, SOLUTION INTRAMUSCULAR; INTRAVENOUS; PARENTERAL at 09:31

## 2019-06-17 RX ADMIN — SODIUM CHLORIDE, SODIUM LACTATE, POTASSIUM CHLORIDE, AND CALCIUM CHLORIDE: 600; 310; 30; 20 INJECTION, SOLUTION INTRAVENOUS at 07:27

## 2019-06-17 RX ADMIN — DEXAMETHASONE SODIUM PHOSPHATE 10 MG: 4 INJECTION, SOLUTION INTRAMUSCULAR; INTRAVENOUS at 07:29

## 2019-06-17 RX ADMIN — ROCURONIUM BROMIDE 10 MG: 10 INJECTION, SOLUTION INTRAVENOUS at 09:31

## 2019-06-17 RX ADMIN — ONDANSETRON 4 MG: 2 INJECTION INTRAMUSCULAR; INTRAVENOUS at 11:17

## 2019-06-17 RX ADMIN — TRIAMCINOLONE ACETONIDE: 1 CREAM TOPICAL at 23:28

## 2019-06-17 RX ADMIN — PREGABALIN 150 MG: 150 CAPSULE ORAL at 07:11

## 2019-06-17 RX ADMIN — FENTANYL CITRATE 50 MCG: 50 INJECTION INTRAMUSCULAR; INTRAVENOUS at 09:46

## 2019-06-17 RX ADMIN — FENTANYL CITRATE 50 MCG: 50 INJECTION INTRAMUSCULAR; INTRAVENOUS at 08:22

## 2019-06-17 RX ADMIN — DEXAMETHASONE SODIUM PHOSPHATE 4 MG: 4 INJECTION, SOLUTION INTRAMUSCULAR; INTRAVENOUS at 11:17

## 2019-06-17 RX ADMIN — CELECOXIB 400 MG: 100 CAPSULE ORAL at 07:12

## 2019-06-17 RX ADMIN — ATENOLOL 50 MG: 50 TABLET ORAL at 17:59

## 2019-06-17 RX ADMIN — FENTANYL CITRATE 50 MCG: 50 INJECTION INTRAMUSCULAR; INTRAVENOUS at 09:31

## 2019-06-17 RX ADMIN — SODIUM CHLORIDE: 4.5 INJECTION, SOLUTION INTRAVENOUS at 13:48

## 2019-06-17 RX ADMIN — FENTANYL CITRATE 50 MCG: 50 INJECTION INTRAMUSCULAR; INTRAVENOUS at 08:27

## 2019-06-17 RX ADMIN — CALCIUM CARBONATE 1000 MG: 500 TABLET, CHEWABLE ORAL at 23:19

## 2019-06-17 RX ADMIN — LISINOPRIL AND HYDROCHLOROTHIAZIDE 1 TABLET: 25; 20 TABLET ORAL at 17:59

## 2019-06-17 RX ADMIN — HYDROMORPHONE HYDROCHLORIDE 0.5 MG: 1 INJECTION, SOLUTION INTRAMUSCULAR; INTRAVENOUS; SUBCUTANEOUS at 12:47

## 2019-06-17 RX ADMIN — ASPIRIN 325 MG: 325 TABLET, DELAYED RELEASE ORAL at 21:51

## 2019-06-17 RX ADMIN — Medication 10 ML: at 23:30

## 2019-06-17 RX ADMIN — CALCIUM 500 MG: 500 TABLET ORAL at 21:51

## 2019-06-17 RX ADMIN — DEXTROSE MONOHYDRATE 2 G: 50 INJECTION, SOLUTION INTRAVENOUS at 18:56

## 2019-06-17 RX ADMIN — MIDAZOLAM HYDROCHLORIDE 2 MG: 2 INJECTION, SOLUTION INTRAMUSCULAR; INTRAVENOUS at 08:27

## 2019-06-17 RX ADMIN — HYDROCORTISONE: 25 CREAM TOPICAL at 23:28

## 2019-06-17 RX ADMIN — ROPIVACAINE HYDROCHLORIDE 50 ML: 5 INJECTION, SOLUTION EPIDURAL; INFILTRATION; PERINEURAL at 08:29

## 2019-06-17 RX ADMIN — TRANEXAMIC ACID 1215 MG: 1 INJECTION, SOLUTION INTRAVENOUS at 09:46

## 2019-06-17 RX ADMIN — NAPROXEN 250 MG: 500 TABLET ORAL at 17:58

## 2019-06-17 RX ADMIN — MIDAZOLAM 2 MG: 1 INJECTION INTRAMUSCULAR; INTRAVENOUS at 08:22

## 2019-06-17 RX ADMIN — MIDAZOLAM HYDROCHLORIDE 2 MG: 2 INJECTION, SOLUTION INTRAMUSCULAR; INTRAVENOUS at 09:24

## 2019-06-17 RX ADMIN — SODIUM CHLORIDE, SODIUM LACTATE, POTASSIUM CHLORIDE, AND CALCIUM CHLORIDE: 600; 310; 30; 20 INJECTION, SOLUTION INTRAVENOUS at 11:06

## 2019-06-17 RX ADMIN — CEFAZOLIN SODIUM 2 G: 2 SOLUTION INTRAVENOUS at 09:46

## 2019-06-17 RX ADMIN — METHOCARBAMOL TABLETS 500 MG: 500 TABLET, COATED ORAL at 17:59

## 2019-06-17 ASSESSMENT — PULMONARY FUNCTION TESTS
PIF_VALUE: 25
PIF_VALUE: 26
PIF_VALUE: 24
PIF_VALUE: 26
PIF_VALUE: 24
PIF_VALUE: 24
PIF_VALUE: 27
PIF_VALUE: 27
PIF_VALUE: 25
PIF_VALUE: 31
PIF_VALUE: 26
PIF_VALUE: 28
PIF_VALUE: 23
PIF_VALUE: 26
PIF_VALUE: 27
PIF_VALUE: 26
PIF_VALUE: 27
PIF_VALUE: 30
PIF_VALUE: 24
PIF_VALUE: 24
PIF_VALUE: 27
PIF_VALUE: 25
PIF_VALUE: 24
PIF_VALUE: 28
PIF_VALUE: 25
PIF_VALUE: 25
PIF_VALUE: 1
PIF_VALUE: 24
PIF_VALUE: 27
PIF_VALUE: 23
PIF_VALUE: 26
PIF_VALUE: 27
PIF_VALUE: 26
PIF_VALUE: 24
PIF_VALUE: 25
PIF_VALUE: 28
PIF_VALUE: 23
PIF_VALUE: 24
PIF_VALUE: 24
PIF_VALUE: 23
PIF_VALUE: 23
PIF_VALUE: 32
PIF_VALUE: 23
PIF_VALUE: 1
PIF_VALUE: 25
PIF_VALUE: 25
PIF_VALUE: 27
PIF_VALUE: 25
PIF_VALUE: 26
PIF_VALUE: 28
PIF_VALUE: 22
PIF_VALUE: 1
PIF_VALUE: 26
PIF_VALUE: 26
PIF_VALUE: 23
PIF_VALUE: 28
PIF_VALUE: 24
PIF_VALUE: 28
PIF_VALUE: 23
PIF_VALUE: 29
PIF_VALUE: 25
PIF_VALUE: 22
PIF_VALUE: 28
PIF_VALUE: 26
PIF_VALUE: 24
PIF_VALUE: 25
PIF_VALUE: 24
PIF_VALUE: 23
PIF_VALUE: 27
PIF_VALUE: 29
PIF_VALUE: 28
PIF_VALUE: 25
PIF_VALUE: 4
PIF_VALUE: 26
PIF_VALUE: 25
PIF_VALUE: 26
PIF_VALUE: 11
PIF_VALUE: 1
PIF_VALUE: 24
PIF_VALUE: 23
PIF_VALUE: 26
PIF_VALUE: 1
PIF_VALUE: 21
PIF_VALUE: 28
PIF_VALUE: 25
PIF_VALUE: 27
PIF_VALUE: 27
PIF_VALUE: 20
PIF_VALUE: 24
PIF_VALUE: 27
PIF_VALUE: 22
PIF_VALUE: 23
PIF_VALUE: 24
PIF_VALUE: 25
PIF_VALUE: 27
PIF_VALUE: 23
PIF_VALUE: 28
PIF_VALUE: 28
PIF_VALUE: 24
PIF_VALUE: 23
PIF_VALUE: 1
PIF_VALUE: 22
PIF_VALUE: 24
PIF_VALUE: 23
PIF_VALUE: 27
PIF_VALUE: 26
PIF_VALUE: 28
PIF_VALUE: 20
PIF_VALUE: 24
PIF_VALUE: 25
PIF_VALUE: 23
PIF_VALUE: 26
PIF_VALUE: 29
PIF_VALUE: 19
PIF_VALUE: 23
PIF_VALUE: 28
PIF_VALUE: 25
PIF_VALUE: 0
PIF_VALUE: 29
PIF_VALUE: 23
PIF_VALUE: 27
PIF_VALUE: 23
PIF_VALUE: 23
PIF_VALUE: 24
PIF_VALUE: 23
PIF_VALUE: 29
PIF_VALUE: 27
PIF_VALUE: 22
PIF_VALUE: 25
PIF_VALUE: 24
PIF_VALUE: 28
PIF_VALUE: 0
PIF_VALUE: 23
PIF_VALUE: 24
PIF_VALUE: 24
PIF_VALUE: 11
PIF_VALUE: 22
PIF_VALUE: 25
PIF_VALUE: 28
PIF_VALUE: 26
PIF_VALUE: 20
PIF_VALUE: 24
PIF_VALUE: 23
PIF_VALUE: 22
PIF_VALUE: 31
PIF_VALUE: 29
PIF_VALUE: 1
PIF_VALUE: 27
PIF_VALUE: 27
PIF_VALUE: 23
PIF_VALUE: 24
PIF_VALUE: 25
PIF_VALUE: 24
PIF_VALUE: 26
PIF_VALUE: 20
PIF_VALUE: 27
PIF_VALUE: 27
PIF_VALUE: 26
PIF_VALUE: 23
PIF_VALUE: 25
PIF_VALUE: 22
PIF_VALUE: 29
PIF_VALUE: 22
PIF_VALUE: 28

## 2019-06-17 ASSESSMENT — PAIN DESCRIPTION - DESCRIPTORS
DESCRIPTORS: ACHING
DESCRIPTORS: ACHING;PRESSURE
DESCRIPTORS: ACHING;NAGGING
DESCRIPTORS: ACHING

## 2019-06-17 ASSESSMENT — PAIN SCALES - GENERAL
PAINLEVEL_OUTOF10: 5
PAINLEVEL_OUTOF10: 6
PAINLEVEL_OUTOF10: 6
PAINLEVEL_OUTOF10: 2
PAINLEVEL_OUTOF10: 7
PAINLEVEL_OUTOF10: 7
PAINLEVEL_OUTOF10: 5
PAINLEVEL_OUTOF10: 9
PAINLEVEL_OUTOF10: 5
PAINLEVEL_OUTOF10: 4
PAINLEVEL_OUTOF10: 9

## 2019-06-17 ASSESSMENT — PAIN DESCRIPTION - ONSET
ONSET: ON-GOING

## 2019-06-17 ASSESSMENT — PAIN DESCRIPTION - FREQUENCY
FREQUENCY: CONTINUOUS

## 2019-06-17 ASSESSMENT — PAIN DESCRIPTION - LOCATION
LOCATION: HIP

## 2019-06-17 ASSESSMENT — PAIN - FUNCTIONAL ASSESSMENT: PAIN_FUNCTIONAL_ASSESSMENT: 0-10

## 2019-06-17 ASSESSMENT — PAIN DESCRIPTION - ORIENTATION
ORIENTATION: RIGHT

## 2019-06-17 ASSESSMENT — PAIN DESCRIPTION - PROGRESSION
CLINICAL_PROGRESSION: NOT CHANGED
CLINICAL_PROGRESSION: GRADUALLY WORSENING
CLINICAL_PROGRESSION: GRADUALLY IMPROVING

## 2019-06-17 ASSESSMENT — PAIN DESCRIPTION - PAIN TYPE
TYPE: SURGICAL PAIN

## 2019-06-17 NOTE — PROGRESS NOTES
MD started next case, unable to come to PACU but asked if area soft, PPP and informed yes. MD stated patient has wide hips but to continue to monitor. ICE Bags x 2 intact along with wound vac.

## 2019-06-17 NOTE — ANESTHESIA PROCEDURE NOTES
Fascia Iliaca    Patient location during procedure: pre-op  Staffing  Anesthesiologist: Zackery Dubose MD  Preanesthetic Checklist  Completed: patient identified, site marked, surgical consent, pre-op evaluation, timeout performed, IV checked, risks and benefits discussed, monitors and equipment checked, anesthesia consent given, oxygen available and patient being monitored  Peripheral Block  Patient position: supine  Prep: ChloraPrep  Patient monitoring: cardiac monitor, continuous pulse ox, frequent blood pressure checks and IV access  Block type: Fascia iliaca  Laterality: right  Injection technique: single-shot  Procedures: ultrasound guided  Needle  Needle type: pencil-tip   Needle gauge: 21 G  Needle length: 8 cm  Needle localization: anatomical landmarks and ultrasound guidance  Test dose: negative  Assessment  Injection assessment: negative aspiration for heme, no paresthesia on injection and local visualized surrounding nerve on ultrasound  Paresthesia pain: none  Slow fractionated injection: yes  Hemodynamics: stable  Reason for block: post-op pain management and at surgeon's request

## 2019-06-17 NOTE — ANESTHESIA PRE PROCEDURE
Department of Anesthesiology  Preprocedure Note       Name:  Xena Chavarria   Age:  60 y.o.  :  1960                                          MRN:  [id number]         Date:  2019      Surgeon: Cecilia Celaya):  Shania Garcia MD    Procedure: RIGHT TOTAL HIP ARTHROPLASTY ANTERIOR APPROACH (Right )    Medications prior to admission:   Prior to Admission medications    Medication Sig Start Date End Date Taking? Authorizing Provider   CALCIUM PO Take 1 tablet by mouth 2 times daily    Historical Provider, MD   Omega-3 Fatty Acids (FISH OIL PO) Take 2 capsules by mouth daily    Historical Provider, MD   Menthol, Topical Analgesic, (ICY HOT PAIN RELIEVING EX) Apply 1 patch topically daily as needed ONLY APPLIES WHEN HAS TO WORK OR GO OUT FOR THE DAY TO HELP CONTROL PAIN    Historical Provider, MD   oxyCODONE-acetaminophen (PERCOCET) 5-325 MG per tablet Take 1 tablet by mouth every 6 hours as needed for Pain for up to 7 days. Intended supply: 7 days. Take lowest dose possible to manage pain 6/10/19 6/17/19  LILI Cloud   oxyCODONE-acetaminophen (PERCOCET) 5-325 MG per tablet Take 1 tablet by mouth every 6 hours as needed for Pain.     Historical Provider, MD   Prenatal Vit-Fe Fumarate-FA (PRENATAL VITAMIN PLUS LOW IRON) 27-1 MG TABS Take 1 tablet by mouth 2 times daily 19   Shania Garcia MD   triamcinolone (KENALOG) 0.1 % cream APPLY TOPICALLY TWICE A DAY 19   Basil Dunn MD   methocarbamol (ROBAXIN) 500 MG tablet TAKE ONE TABLET BY MOUTH DAILY 19   Basil Dunn MD   atenolol (TENORMIN) 50 MG tablet TAKE ONE TABLET BY MOUTH DAILY 19   Basil Dunn MD   diclofenac sodium 1 % GEL Apply 2 g topically 2 times daily 19   Basil Dunn MD   hydrocortisone 2.5 % cream APPLY TO THE FACE TWO TIMES A DAY AS NEEDED 19   Basil Dunn MD   lisinopril-hydrochlorothiazide (PRINZIDE;ZESTORETIC) 20-25 MG per tablet TAKE ONE TABLET BY MOUTH DAILY 19   Basil Dunn MD   vitamin D (ERGOCALCIFEROL) 60594 units CAPS capsule Take 1 capsule by mouth once a week for 8 doses 4/8/19 6/12/19  Randolph Foster MD   naproxen (NAPROSYN) 250 MG tablet Take 1 tablet by mouth daily 4/5/19   Randolph Foster MD       Current medications:    Current Facility-Administered Medications   Medication Dose Route Frequency Provider Last Rate Last Dose    lactated ringers infusion   Intravenous Continuous Taj Tucker MD        sodium chloride flush 0.9 % injection 10 mL  10 mL Intravenous 2 times per day Taj Tucker MD        sodium chloride flush 0.9 % injection 10 mL  10 mL Intravenous PRN Taj Tucker MD        lidocaine PF 1 % injection 1 mL  1 mL Intradermal Once PRN Taj Tucker MD        acetaminophen (OFIRMEV) infusion 750 mg  750 mg Intravenous Once Jazmin Ross MD        ceFAZolin (ANCEF) 2 g in dextrose 3 % 50 mL IVPB (duplex)  2 g Intravenous Once Jazmin Ross MD        celecoxib (CELEBREX) capsule 400 mg  400 mg Oral Once Jazmin Ross MD        Dexamethasone Sodium Phosphate injection 10 mg  10 mg Intravenous Once Jazmin Ross MD        ortho mix (with morphine) injection   Injection On Call Jazmin Ross MD        oxyCODONE (OXYCONTIN) extended release tablet 20 mg  20 mg Oral Once Jazmin Ross MD        pregabalin (LYRICA) capsule 150 mg  150 mg Oral Once Jazmin Ross MD        tranexamic acid (CYKLOKAPRON) 1,215 mg in sodium chloride 0.9 % 50 mL IVPB  15 mg/kg Intravenous Once Jazmin Ross MD         Current Outpatient Medications   Medication Sig Dispense Refill    CALCIUM PO Take 1 tablet by mouth 2 times daily      Omega-3 Fatty Acids (FISH OIL PO) Take 2 capsules by mouth daily      Menthol, Topical Analgesic, (ICY HOT PAIN RELIEVING EX) Apply 1 patch topically daily as needed ONLY APPLIES WHEN HAS TO WORK OR GO OUT FOR THE DAY TO HELP CONTROL PAIN      oxyCODONE-acetaminophen (PERCOCET) 5-325 MG per tablet Take 1 05/31/19 130/80   05/22/19 (!) 151/82       NPO Status:                                                                                 BMI:   Wt Readings from Last 3 Encounters:   06/12/19 178 lb 9.6 oz (81 kg)   05/31/19 174 lb (78.9 kg)   05/22/19 174 lb (78.9 kg)     There is no height or weight on file to calculate BMI.    CBC:   Lab Results   Component Value Date    WBC 4.2 05/31/2019    RBC 4.72 05/31/2019    HGB 14.4 05/31/2019    HCT 43.4 05/31/2019    MCV 91.9 05/31/2019    RDW 14.0 05/31/2019     05/31/2019       CMP:   Lab Results   Component Value Date     05/31/2019    K 4.2 05/31/2019     05/31/2019    CO2 27 05/31/2019    BUN 27 05/31/2019    CREATININE 0.6 05/31/2019    GFRAA >60 05/31/2019    AGRATIO 1.7 05/31/2019    LABGLOM >60 05/31/2019    GLUCOSE 89 05/31/2019    PROT 7.8 05/31/2019    CALCIUM 9.8 05/31/2019    BILITOT 0.3 05/31/2019    ALKPHOS 103 05/31/2019    AST 18 05/31/2019    ALT 13 05/31/2019       POC Tests: No results for input(s): POCGLU, POCNA, POCK, POCCL, POCBUN, POCHEMO, POCHCT in the last 72 hours.     Coags:   Lab Results   Component Value Date    PROTIME 11.7 06/12/2019    INR 1.03 06/12/2019    APTT 36.4 06/12/2019       HCG (If Applicable): No results found for: PREGTESTUR, PREGSERUM, HCG, HCGQUANT     ABGs: No results found for: PHART, PO2ART, ZGT0FEI, PLM8BQF, BEART, P0XIOIKD     Type & Screen (If Applicable):  No results found for: LABABO, [address]    Anesthesia Evaluation  Patient summary reviewed and Nursing notes reviewed no history of anesthetic complications:   Airway: Mallampati: III  TM distance: >3 FB   Neck ROM: full  Mouth opening: > = 3 FB Dental: normal exam         Pulmonary:Negative Pulmonary ROS and normal exam  breath sounds clear to auscultation                             Cardiovascular:    (+) hypertension: moderate,         Rhythm: regular  Rate: normal                    Neuro/Psych:   Negative Neuro/Psych ROS GI/Hepatic/Renal:   (+) morbid obesity     (-) hiatal hernia and GERD       Endo/Other:    (+) : arthritis: OA., .                  ROS comment: Eczema Abdominal:           Vascular: negative vascular ROS. Anesthesia Plan      general and regional     ASA 2     (Fascia Iliaca Block)  Induction: intravenous. MIPS: Postoperative opioids intended. Anesthetic plan and risks discussed with patient. Plan discussed with CRNA.     Attending anesthesiologist reviewed and agrees with Darion Baltazar MD   6/17/2019

## 2019-06-17 NOTE — PROGRESS NOTES
Fish oil ordered for patient. Per [facility] policy, herbals are not to be administered in the hospital.  Will discontinue order for fish oil. May resume upon discharge. Please call pharmacy with any questions. Thanks!   Felipe Brown, PharmD  [id number]  6/17/2019 4:43 PM

## 2019-06-17 NOTE — OP NOTE
Direct Anterior Total Hip Operative Note    Patient: Bryan Quiles MRN: [id number]     YOB: 1960  Age: 60 y.o. Sex: female      Date of Operation: 06/17/19      Preoperative Diagnosis: End-stage osteoarthritis,  right hip. Postoperative Diagnosis: End-stage osteoarthritis, right hip. Procedure Performed: Direct anterior right total hip arthroplasty. Surgeon: Babatunde Lee  Assistant(s): Circulator: Zehra Nava RN  Surgical Assistant: Daniella Hallman  Radiology Technologist: Antonio Zelaya  Scrub Person First: Verner Reichert, MA  Scrub Person Second: Divya Ramírez  Circulator Assist: Ranjith Paulson RN  Physician Assistant: LILI Brock  Anesthesia: General  Estimated Blood Loss:  less than 400 ml  Drains: None  Implants:  [employer]   Anthology stem , size 5 , std Offset ; 36mm -3 femoral head. 52 mm  R3 acetabulum with a 20 degree offset XLPE liner with 2 dome screws. Indication For Operation: This is a 60 y.o. female with end-stage osteoarthritis of she right hip that failed to respond to prolonged nonoperative measures. They had no contraindications to surgery. In the office treatment was thoroughly discussed including above procedure. The relative risks and benefits of direct anterior versus posterior approach of the right hip were discussed. The relative increase risk of iatrogenic fracture, cutaneous nerve injury with a direct anterior approach were discussed along with potential wound healing problems versus the relative risks of dislocation, leg length discrepancy with posterior approach and they elected an anterior approach at this time. They gave informed consent to proceed. Description of Procedure: [facility] and a after satisfactory induction of general endotracheal anesthesia after preoperative regional block (fascia iliacus).      The  righthip and lower extremity were then prepped and draped in the rectus femoris. Excellent anterior capsule repair was obtained. The anterior capsule and deep fascia was then injected with additional 60 cc of injection cocktail. The fascia over the fascia home was closed with a running number 2 Stratofix  Suture with an additional layer of 1 Stratofix. Subcutaneous layer was closed with 2-0 Stratofix with nylon skin closure because of the large layer of fat tissues and pervena for final wound dressing. The patient was reversed from anesthesia, taken out the fracture table, extubated and taken to the recovery room in stable condition, tolerated the procedure well. Sponge and needle counts correct times 2.     arricept wash and irricept as well as orthomix were used intraoperatively. Complications: None  Disposition: Admission after recovery  Condition: Stable  Attending Attestation: I was present and scrubbed for the entire procedure.     Signed by: Ronnie Pringle, 6/17/2019

## 2019-06-17 NOTE — ANESTHESIA POSTPROCEDURE EVALUATION
Department of Anesthesiology  Postprocedure Note    Patient: Jessica Riddle  MRN: [id number]  YOB: 1960  Date of evaluation: 6/17/2019  Time:  4:55 PM     Procedure Summary     Date:  06/17/19 Room / Location:  [facility] OR 11 / [facility] OR    Anesthesia Start:  0924 Anesthesia Stop:  1381    Procedure:  RIGHT TOTAL HIP ARTHROPLASTY ANTERIOR APPROACH (Right ) Diagnosis:  (RIGHT HIP OSTEOARTHRITIS)    Surgeon:  Ravinder Evans MD Responsible Provider:  Alvarez Do MD    Anesthesia Type:  general, regional ASA Status:  2          Anesthesia Type: general, regional    Anup Phase I: Anup Score: 9    Anup Phase II:      Last vitals: Reviewed and per EMR flowsheets.        Anesthesia Post Evaluation    Patient location during evaluation: PACU  Patient participation: complete - patient participated  Level of consciousness: awake and alert  Airway patency: patent  Nausea & Vomiting: no nausea and no vomiting  Cardiovascular status: blood pressure returned to baseline  Respiratory status: acceptable  Hydration status: euvolemic

## 2019-06-17 NOTE — H&P
Kimberli Gomez    [id number]    [facility] Same Day Surgery Update H & P  Department of General Surgery   Surgical Service   Pre-operative History and Physical  Last H & P within the last 30 days. DIAGNOSIS:   RIGHT HIP OSTEOARTHRITIS    PROCEDURE:  KY TOTAL HIP ARTHROPLASTY [73632] (RIGHT TOTAL HIP ARTHROPLASTY ANTERIOR APPROACH)     HISTORY OF PRESENT ILLNESS:    Patient with chronic right hip pain and limited ROM in the setting of arthrosis. The symptoms have been recalcitrant to conservative treatment and the patient presents today for the above procedure. Past Medical History:        Diagnosis Date    Arthritis     Broken teeth     BACK BILATERAL LOWER    Eczema     Hypertension      Past Surgical History:    Past surgical history reviewed. No pertinent past surgical history.      Past Social History:  Social History     Socioeconomic History    Marital status:      Spouse name: Not on file    Number of children: Not on file    Years of education: Not on file    Highest education level: Not on file   Occupational History    Not on file   Social Needs    Financial resource strain: Not on file    Food insecurity:     Worry: Not on file     Inability: Not on file    Transportation needs:     Medical: Not on file     Non-medical: Not on file   Tobacco Use    Smoking status: Never Smoker    Smokeless tobacco: Never Used   Substance and Sexual Activity    Alcohol use: Yes     Comment: 2 cans beer a day    Drug use: No    Sexual activity: Yes     Partners: Male   Lifestyle    Physical activity:     Days per week: Not on file     Minutes per session: Not on file    Stress: Not on file   Relationships    Social connections:     Talks on phone: Not on file     Gets together: Not on file     Attends [employer] service: Not on file     Active member of club or organization: Not on file     Attends meetings of clubs or organizations: Not on file     Relationship status: Not on file  Intimate partner violence:     Fear of current or ex partner: Not on file     Emotionally abused: Not on file     Physically abused: Not on file     Forced sexual activity: Not on file   Other Topics Concern    Not on file   Social History Narrative    Not on file         Medications Prior to Admission:      Prior to Admission medications    Medication Sig Start Date End Date Taking? Authorizing Provider   oxyCODONE-acetaminophen (PERCOCET) 5-325 MG per tablet Take 1 tablet by mouth every 6 hours as needed for Pain for up to 7 days. Intended supply: 7 days. Take lowest dose possible to manage pain 6/10/19 6/17/19 Yes LILI Mahmood   methocarbamol (ROBAXIN) 500 MG tablet TAKE ONE TABLET BY MOUTH DAILY 5/7/19  Yes Tim Kennedy MD   atenolol (TENORMIN) 50 MG tablet TAKE ONE TABLET BY MOUTH DAILY 4/9/19  Yes Tim Kennedy MD   lisinopril-hydrochlorothiazide (PRINZIDE;ZESTORETIC) 20-25 MG per tablet TAKE ONE TABLET BY MOUTH DAILY 4/9/19  Yes Tim Kennedy MD   naproxen (NAPROSYN) 250 MG tablet Take 1 tablet by mouth daily 4/5/19  Yes Tim Kennedy MD   CALCIUM PO Take 1 tablet by mouth 2 times daily    Historical Provider, MD   Omega-3 Fatty Acids (FISH OIL PO) Take 2 capsules by mouth daily    Historical Provider, MD   Menthol, Topical Analgesic, (ICY HOT PAIN RELIEVING EX) Apply 1 patch topically daily as needed ONLY APPLIES WHEN HAS TO WORK OR GO OUT FOR THE DAY TO HELP CONTROL PAIN    Historical Provider, MD   oxyCODONE-acetaminophen (PERCOCET) 5-325 MG per tablet Take 1 tablet by mouth every 6 hours as needed for Pain.     Historical Provider, MD   Prenatal Vit-Fe Fumarate-FA (PRENATAL VITAMIN PLUS LOW IRON) 27-1 MG TABS Take 1 tablet by mouth 2 times daily 5/22/19   Emily Major MD   triamcinolone (KENALOG) 0.1 % cream APPLY TOPICALLY TWICE A DAY 5/9/19   Tim Kennedy MD   diclofenac sodium 1 % GEL Apply 2 g topically 2 times daily 4/9/19   Tim Kennedy MD   hydrocortisone 2.5 % cream APPLY TO THE FACE TWO TIMES A DAY AS NEEDED 4/9/19   Latoya Neville MD   vitamin D (ERGOCALCIFEROL) 71612 units CAPS capsule Take 1 capsule by mouth once a week for 8 doses 4/8/19 6/12/19  Latoya Neville MD         Allergies:  Patient has no known allergies. PHYSICAL EXAM:      /87   Pulse 70   Temp 97.9 °F (36.6 °C) (Oral)   Resp 18   Ht 5' 2\" (1.575 m)   Wt 178 lb 9.6 oz (81 kg)   SpO2 96%   BMI 32.67 kg/m²      Heart:  Regular rate and rhythm, No murmur noted    Lungs: No increased work of breathing, good air exchange, clear to ausculation bilaterally     Abdomen:  Soft, non-distended, non-tender, normal active bowel sounds, no masses palpated    ASSESSMENT AND PLAN:    1. Patient seen and focused exam done today- no new changes since last physical exam on 5/31/19    2. Access to ancillary services are available per request of the provider.     NASRIN Mckee - KEVIN     6/17/2019

## 2019-06-17 NOTE — PROGRESS NOTES
Noted surgical site of right anterior hip with wound vac very swollen but soft. ICE BAG applied, called into  room and asked someone to come examine site.

## 2019-06-17 NOTE — PROGRESS NOTES
Patient arrived from OR to PACU # 12 s/p RIGHT TOTAL HIP ARTHROPLASTY ANTERIOR APPROACH per . Attached to PACU monitoring device, report received from CRNA who reported no problems intraoperatively, VSS. Patient received pre-op block per DR. Lea Kline.

## 2019-06-17 NOTE — PROGRESS NOTES
called per RN earlier and informed right inner thigh slightly swollen and right hip with slightly increased swelling as well. Stated he would come see patient when done with current case.  now in PACU to see patient, stated OK to transfer patient to floor and informed nurses to continue to monitor swelling and can place 5 lb sandbag to area. PPP, able to wiggle foot, extremity warm and dry. VSS, HR dipped to upper 40's-50's when asleep. Will call nurse for bedside report.

## 2019-06-18 LAB
ALBUMIN SERPL-MCNC: 3.4 G/DL (ref 3.4–5)
ANION GAP SERPL CALCULATED.3IONS-SCNC: 11 MMOL/L (ref 3–16)
ANION GAP SERPL CALCULATED.3IONS-SCNC: 13 MMOL/L (ref 3–16)
BUN BLDV-MCNC: 28 MG/DL (ref 7–20)
BUN BLDV-MCNC: 32 MG/DL (ref 7–20)
CALCIUM SERPL-MCNC: 8.5 MG/DL (ref 8.3–10.6)
CALCIUM SERPL-MCNC: 9.4 MG/DL (ref 8.3–10.6)
CHLORIDE BLD-SCNC: 101 MMOL/L (ref 99–110)
CHLORIDE BLD-SCNC: 105 MMOL/L (ref 99–110)
CO2: 23 MMOL/L (ref 21–32)
CO2: 26 MMOL/L (ref 21–32)
CREAT SERPL-MCNC: 0.5 MG/DL (ref 0.6–1.1)
CREAT SERPL-MCNC: 0.6 MG/DL (ref 0.6–1.1)
GFR AFRICAN AMERICAN: >60
GFR AFRICAN AMERICAN: >60
GFR NON-AFRICAN AMERICAN: >60
GFR NON-AFRICAN AMERICAN: >60
GLUCOSE BLD-MCNC: 113 MG/DL (ref 70–99)
GLUCOSE BLD-MCNC: 118 MG/DL (ref 70–99)
HCT VFR BLD CALC: 28.9 % (ref 36–48)
HCT VFR BLD CALC: 32 % (ref 36–48)
HEMOGLOBIN: 10.9 G/DL (ref 12–16)
HEMOGLOBIN: 9.6 G/DL (ref 12–16)
MCH RBC QN AUTO: 31.2 PG (ref 26–34)
MCH RBC QN AUTO: 32 PG (ref 26–34)
MCHC RBC AUTO-ENTMCNC: 33 G/DL (ref 31–36)
MCHC RBC AUTO-ENTMCNC: 34 G/DL (ref 31–36)
MCV RBC AUTO: 93.9 FL (ref 80–100)
MCV RBC AUTO: 94.5 FL (ref 80–100)
PDW BLD-RTO: 13.5 % (ref 12.4–15.4)
PDW BLD-RTO: 13.5 % (ref 12.4–15.4)
PHOSPHORUS: 2.5 MG/DL (ref 2.5–4.9)
PLATELET # BLD: 162 K/UL (ref 135–450)
PLATELET # BLD: 203 K/UL (ref 135–450)
PMV BLD AUTO: 7.7 FL (ref 5–10.5)
PMV BLD AUTO: 8 FL (ref 5–10.5)
POTASSIUM SERPL-SCNC: 4.4 MMOL/L (ref 3.5–5.1)
POTASSIUM SERPL-SCNC: 4.8 MMOL/L (ref 3.5–5.1)
RBC # BLD: 3.06 M/UL (ref 4–5.2)
RBC # BLD: 3.41 M/UL (ref 4–5.2)
SODIUM BLD-SCNC: 138 MMOL/L (ref 136–145)
SODIUM BLD-SCNC: 141 MMOL/L (ref 136–145)
WBC # BLD: 5.9 K/UL (ref 4–11)
WBC # BLD: 8.5 K/UL (ref 4–11)

## 2019-06-18 PROCEDURE — 2580000003 HC RX 258: Performed by: INTERNAL MEDICINE

## 2019-06-18 PROCEDURE — 1200000000 HC SEMI PRIVATE

## 2019-06-18 PROCEDURE — 97162 PT EVAL MOD COMPLEX 30 MIN: CPT

## 2019-06-18 PROCEDURE — 2580000003 HC RX 258: Performed by: PHYSICIAN ASSISTANT

## 2019-06-18 PROCEDURE — 2580000003 HC RX 258: Performed by: NURSE PRACTITIONER

## 2019-06-18 PROCEDURE — 6370000000 HC RX 637 (ALT 250 FOR IP): Performed by: PHYSICIAN ASSISTANT

## 2019-06-18 PROCEDURE — 99253 IP/OBS CNSLTJ NEW/EST LOW 45: CPT | Performed by: INTERNAL MEDICINE

## 2019-06-18 PROCEDURE — 97530 THERAPEUTIC ACTIVITIES: CPT

## 2019-06-18 PROCEDURE — 80069 RENAL FUNCTION PANEL: CPT

## 2019-06-18 PROCEDURE — 36415 COLL VENOUS BLD VENIPUNCTURE: CPT

## 2019-06-18 PROCEDURE — 6370000000 HC RX 637 (ALT 250 FOR IP): Performed by: ORTHOPAEDIC SURGERY

## 2019-06-18 PROCEDURE — 97535 SELF CARE MNGMENT TRAINING: CPT

## 2019-06-18 PROCEDURE — 97116 GAIT TRAINING THERAPY: CPT

## 2019-06-18 PROCEDURE — 97166 OT EVAL MOD COMPLEX 45 MIN: CPT

## 2019-06-18 PROCEDURE — 6360000002 HC RX W HCPCS: Performed by: PHYSICIAN ASSISTANT

## 2019-06-18 PROCEDURE — 85027 COMPLETE CBC AUTOMATED: CPT

## 2019-06-18 RX ORDER — CEFADROXIL 500 MG/1
500 CAPSULE ORAL 2 TIMES DAILY
Qty: 14 CAPSULE | Refills: 0 | Status: SHIPPED | OUTPATIENT
Start: 2019-06-18 | End: 2019-06-25

## 2019-06-18 RX ORDER — OXYCODONE HYDROCHLORIDE AND ACETAMINOPHEN 5; 325 MG/1; MG/1
1-2 TABLET ORAL EVERY 4 HOURS PRN
Qty: 36 TABLET | Refills: 0 | Status: SHIPPED | OUTPATIENT
Start: 2019-06-18 | End: 2019-06-21

## 2019-06-18 RX ORDER — SODIUM CHLORIDE 9 MG/ML
INJECTION, SOLUTION INTRAVENOUS CONTINUOUS
Status: DISCONTINUED | OUTPATIENT
Start: 2019-06-18 | End: 2019-06-19

## 2019-06-18 RX ORDER — 0.9 % SODIUM CHLORIDE 0.9 %
500 INTRAVENOUS SOLUTION INTRAVENOUS ONCE
Status: COMPLETED | OUTPATIENT
Start: 2019-06-18 | End: 2019-06-18

## 2019-06-18 RX ORDER — PSEUDOEPHEDRINE HCL 30 MG
100 TABLET ORAL 2 TIMES DAILY
Qty: 30 CAPSULE | Refills: 0 | Status: SHIPPED | OUTPATIENT
Start: 2019-06-18 | End: 2019-08-29 | Stop reason: SDUPTHER

## 2019-06-18 RX ORDER — 0.9 % SODIUM CHLORIDE 0.9 %
250 INTRAVENOUS SOLUTION INTRAVENOUS ONCE
Status: COMPLETED | OUTPATIENT
Start: 2019-06-18 | End: 2019-06-18

## 2019-06-18 RX ORDER — PREDNISONE 10 MG/1
10 TABLET ORAL DAILY
Qty: 7 TABLET | Refills: 0 | Status: SHIPPED | OUTPATIENT
Start: 2019-06-19 | End: 2019-06-29

## 2019-06-18 RX ADMIN — SODIUM CHLORIDE 500 ML: 9 INJECTION, SOLUTION INTRAVENOUS at 17:18

## 2019-06-18 RX ADMIN — NAPROXEN 250 MG: 500 TABLET ORAL at 10:22

## 2019-06-18 RX ADMIN — OXYCODONE HYDROCHLORIDE AND ACETAMINOPHEN 1 TABLET: 5; 325 TABLET ORAL at 10:22

## 2019-06-18 RX ADMIN — Medication 10 ML: at 23:11

## 2019-06-18 RX ADMIN — HYDROCORTISONE: 25 CREAM TOPICAL at 10:26

## 2019-06-18 RX ADMIN — ASPIRIN 325 MG: 325 TABLET, DELAYED RELEASE ORAL at 10:20

## 2019-06-18 RX ADMIN — SODIUM CHLORIDE 250 ML: 9 INJECTION, SOLUTION INTRAVENOUS at 16:14

## 2019-06-18 RX ADMIN — CALCIUM 500 MG: 500 TABLET ORAL at 10:22

## 2019-06-18 RX ADMIN — TRIAMCINOLONE ACETONIDE: 1 CREAM TOPICAL at 10:26

## 2019-06-18 RX ADMIN — METHOCARBAMOL TABLETS 500 MG: 500 TABLET, COATED ORAL at 10:22

## 2019-06-18 RX ADMIN — PREDNISONE 10 MG: 10 TABLET ORAL at 10:22

## 2019-06-18 RX ADMIN — HYDROCORTISONE: 25 CREAM TOPICAL at 23:10

## 2019-06-18 RX ADMIN — ASPIRIN 325 MG: 325 TABLET, DELAYED RELEASE ORAL at 23:10

## 2019-06-18 RX ADMIN — Medication 10 ML: at 10:24

## 2019-06-18 RX ADMIN — OXYCODONE HYDROCHLORIDE AND ACETAMINOPHEN 2 TABLET: 5; 325 TABLET ORAL at 06:18

## 2019-06-18 RX ADMIN — SODIUM CHLORIDE: 0.9 INJECTION, SOLUTION INTRAVENOUS at 21:13

## 2019-06-18 RX ADMIN — DEXTROSE MONOHYDRATE 2 G: 50 INJECTION, SOLUTION INTRAVENOUS at 02:19

## 2019-06-18 RX ADMIN — TRIAMCINOLONE ACETONIDE: 1 CREAM TOPICAL at 23:11

## 2019-06-18 RX ADMIN — OXYCODONE HYDROCHLORIDE AND ACETAMINOPHEN 1 TABLET: 5; 325 TABLET ORAL at 23:04

## 2019-06-18 RX ADMIN — DOCUSATE SODIUM 100 MG: 100 CAPSULE, LIQUID FILLED ORAL at 10:21

## 2019-06-18 RX ADMIN — OXYCODONE HYDROCHLORIDE AND ACETAMINOPHEN 2 TABLET: 5; 325 TABLET ORAL at 02:18

## 2019-06-18 RX ADMIN — SODIUM CHLORIDE 250 ML: 9 INJECTION, SOLUTION INTRAVENOUS at 12:11

## 2019-06-18 RX ADMIN — OXYCODONE HYDROCHLORIDE AND ACETAMINOPHEN 2 TABLET: 5; 325 TABLET ORAL at 17:23

## 2019-06-18 ASSESSMENT — PAIN DESCRIPTION - ONSET
ONSET: ON-GOING

## 2019-06-18 ASSESSMENT — PAIN DESCRIPTION - ORIENTATION
ORIENTATION: RIGHT

## 2019-06-18 ASSESSMENT — PAIN DESCRIPTION - FREQUENCY
FREQUENCY: CONTINUOUS

## 2019-06-18 ASSESSMENT — PAIN SCALES - GENERAL
PAINLEVEL_OUTOF10: 7
PAINLEVEL_OUTOF10: 5
PAINLEVEL_OUTOF10: 7
PAINLEVEL_OUTOF10: 0
PAINLEVEL_OUTOF10: 7
PAINLEVEL_OUTOF10: 8
PAINLEVEL_OUTOF10: 5
PAINLEVEL_OUTOF10: 8
PAINLEVEL_OUTOF10: 6
PAINLEVEL_OUTOF10: 5
PAINLEVEL_OUTOF10: 7
PAINLEVEL_OUTOF10: 4

## 2019-06-18 ASSESSMENT — PAIN DESCRIPTION - PROGRESSION
CLINICAL_PROGRESSION: NOT CHANGED

## 2019-06-18 ASSESSMENT — PAIN DESCRIPTION - LOCATION
LOCATION: HIP

## 2019-06-18 ASSESSMENT — PAIN DESCRIPTION - DESCRIPTORS
DESCRIPTORS: ACHING

## 2019-06-18 ASSESSMENT — PAIN DESCRIPTION - PAIN TYPE
TYPE: SURGICAL PAIN

## 2019-06-18 ASSESSMENT — PAIN - FUNCTIONAL ASSESSMENT: PAIN_FUNCTIONAL_ASSESSMENT: ACTIVITIES ARE NOT PREVENTED

## 2019-06-18 NOTE — PROGRESS NOTES
Patient arrived to room 5519 from PACU. Patient is awake, alert and oriented and does not appear in any acute distress. Will continue to monitor, assess and admit.

## 2019-06-18 NOTE — PROGRESS NOTES
VSS. Pain controlled with PO. NV intact. Swelling at surgical site treated with elevation of BLE and ice. No drainage at surgical site or Provena. Lungs clear and bowel tones active. Pt tolerates diet. Skin intact. PIV infusing d/t soft BP's overnight. Pt is up x1 walker and tolerates this well. Pt has been OOB several times this shift. SCDs on. Bed alarm set. Call light in reach. Will continue to monitor.

## 2019-06-18 NOTE — PROGRESS NOTES
Patient's blood pressure 119/78 at 1715. A third 0.9% sodium chloride bolus order and started. Will continue to monitor blood pressure. Patient is asymptomatic at this time. Will continue to monitor.

## 2019-06-18 NOTE — PLAN OF CARE
Patient is a moderated fall risk and is up with assist x1 with the walker and gait belt. Patient alert and oriented x4, fall precautions in place, bed in lowest position and locked, bed alarm on for safety, call light and belongings with in reach. Will continue to monitor.

## 2019-06-18 NOTE — PLAN OF CARE
Problem: Pain:  Goal: Control of acute pain  Description  Patient's right hip pain has remained at a manageable level throughout the duration of the shift with utilization of ice and PO pain intervention. Patient understands prescribed pain medication regimens uses and side effects. Patient understands how to rate pain appropriately and when to call out if experiencing breakthrough pain. Patient understands how to utilize non pharmacological pain management techniques. Pain last rated at 7/10, patient medicated per [facility] with PO pain medication. Will continue to monitor and reassess. 6/18/2019 0427 by Justino Thompson RN  Outcome: Met This Shift     Problem: Infection - Surgical Site:  Goal: Signs of wound healing will improve  Description  Signs of wound healing will improve. Swelling slightly better this AM. Ice applied. No drainage from site or to wound vac. Afebrile. 6/18/2019 0427 by Justino Thompson RN  Outcome: Met This Shift     Problem: Falls - Risk of:  Goal: Will remain free from falls  Description  Pt free from injury this shift and free of falls. 2/4 rails up on bed and bed is in the lowest position. Wheels locked and bed alarm set. Socks on pt and ID bands on pt. Call light in reach of pt and pt educated to call out to get up x1 walker. Will continue to monitor for safety.     6/18/2019 0427 by Justino Thompson RN  Outcome: Met This Shift

## 2019-06-18 NOTE — DISCHARGE SUMMARY
was advanced as tolerated and their incision was checked on POD #1. The incision is dressing in place, clean, dry and intact with no signs of infection. The patient remained neurovascularly intact in the lower extremity and had intact pulses distally. Patients calf remained soft and showed no evidence of DVT. The patient was able to move their leg and ankle/foot without any problems post-operatively. Physical therapy and occupational therapy were consulted and began working with the patient post-operatively. The patient progressed with PT/OT as would be expected and continued to improve through their stay. The patients pain was initially controlled with IV medications but we were able to transition to oral pain medications soon after arrival to the floor and their pain remained under good control through their hospital stay. From a medical standpoint the patient remained stable and continued to have the medicine team follow throughout their stay. The patients dressing was changed/incision was checked on day of d/c. The patient will be discharged at this time to Home  with their current diet restrictions and will continue to follow the hip precautions outlined to them by us and PT/OT. Condition on Discharge: Stable    Plan  Return visit in 2 weeks. .  Patient was instructed on the use of pain medications, the signs and symptoms of infection, and was given our number to call should they have any questions or concerns following discharge. For opioid prescriptions given at discharge the following statement is provided for compliance with [employer] rules. Patient being given increased dosage/quantity of opoid pain medication in excess of [employer] guidelines which noted a 30 MED daily of opioids due to the fact that he/she has undergone major orthopaedic surgery as outlined in rule 4731-11-13. Dosages and further duration of the pain medication will be re-evaluated at her post op visit in 2 weeks.   Patient was educated on dosing expectations and limits of prescribing as a result of the new [place] [facility] rules enacted August 31, 2017. Please also note that this is not the initial opoid prescription issued to this patient but a continuation of medication utilized during the hospital admission as noted in the medical record. [place]ARRS report has also been utilized to screen for any abuse history or suspicious activity as outlined in [place]. All efforts have been taken to prevent abuse potential and misuse of opioid medications including education, screening, and close clinical follow up. Edited to add: Discharge held due to low BP and symptomatic. Discharge summary completed early.

## 2019-06-18 NOTE — PROGRESS NOTES
Physical Therapy  Facility/Department: [facility] 5T ORTHO/NEURO  Daily Treatment Note  NAME: Daniella Zayas  : 1960  MRN: [id number]    Date of Service: 2019    Discharge Recommendations:Karly Jansen scored a 18/24 on the AM-PAC short mobility form. Current research shows that an AM-PAC score of 18 or greater is typically associated with a discharge to the patient's home setting. Based on the patients AM-PAC score and their current functional mobility deficits, it is recommended that the patient have 2-3 sessions per week of Physical Therapy at d/c to increase the patients independence. PT Equipment Recommendations  Equipment Needed: (rolling walker)    Assessment   Assessment: Pt demo hypotensive episode with RN this pm and received fluid bolus. Pt plans to return home tomorrow. If home, recommend 24 hour assist, home PT, use of rolling walker (needs). Treatment Diagnosis: mobility impairment due to R THR  Decision Making: Medium Complexity  Patient Education: Pt educated on PT role, need to call for assist to get up, anterior hip precautions, home dispo and she verbalized understanding. REQUIRES PT FOLLOW UP: Yes  Activity Tolerance  Activity Tolerance: Patient limited by endurance; Patient limited by pain; Patient limited by cognitive status     Patient Diagnosis(es): The encounter diagnosis was S/P hip replacement, right.     has a past medical history of Arthritis, Broken teeth, Eczema, and Hypertension. has a past surgical history that includes Colonoscopy and Total hip arthroplasty (Right, 2019). Restrictions  Position Activity Restriction  Other position/activity restrictions: up with assist, anterior hip precautions, WBAT R     Subjective   General  Chart Reviewed: Yes  Additional Pertinent Hx: 59 yo admitted 19 for R THR per Dr. Jorge Medina. Pmhx:  HTN. Family / Caregiver Present: No  Subjective  Subjective: Pt found up in chair and agreeable to PT.  \"My blood pressure keeps going down so I'm staying tonite. \"  Pain Screening  Patient Currently in Pain: Yes(rates hip pain 5/10, RN aware)         Orientation  Orientation  Overall Orientation Status: Within Functional Limits       Objective   Bed mobility  Supine to Sit: Contact guard assistance(HOB up and use of rail; slow and effortful)  Sit to Supine: Minimal assistance(for LEs)  Comment: Pt plans to sleep in lift chair at home.       Transfers  Sit to Stand: Contact guard assistance(x2 trials)  Stand to sit: Contact guard assistance(x2 trials)     Ambulation  Device: Rolling Walker  Assistance: Contact guard assistance  Quality of Gait: forward posture with slow marifer; pt demo step to gait pattern leading R; WBAT R; slow and effortful  Distance: 35 ft, 20 ft  Stairs/Curb  Stairs?: (up/down 4 steps x2 with cane/rail CGA; max vc and pt demo difficulty staying on task and retaining teaching)        Exercises  Comments: Pt demo THR HEP x10 with min assist for SLR/hip abduction; visual and verbal cues   AROM RLE (degrees)  RLE AROM: WFL(hip flexion to 90 degrees; knee flexion to 90 degrees)  AROM LLE (degrees)  LLE AROM : WFL(hip/knee flexion to 90 degrees)              AM-PAC Score  AM-PAC Inpatient Mobility Raw Score : 18 (06/18/19 1455)  AM-PAC Inpatient T-Scale Score : 43.63 (06/18/19 1455)  Mobility Inpatient CMS 0-100% Score: 46.58 (06/18/19 1455)  Mobility Inpatient CMS G-Code Modifier : CK (06/18/19 1455)          Goals  Short term goals  Time Frame for Short term goals: discharge  Short term goal 1: sit to/from stand supervision  ongoing  Short term goal 2: ambulate 50 ft with rolling walker supervision   ongoing  Short term goal 3: up/down 6 steps with rail/cane supervision   ongoing  Patient Goals   Patient goals : return home    Plan    Plan  Times per week: 7  Times per day: Twice a day  Current Treatment Recommendations: Strengthening, Transfer Training, Gait Training, Stair training, Functional Mobility

## 2019-06-18 NOTE — PLAN OF CARE
Problem: Pain:  Goal: Control of acute pain  Description  Patient's right hip pain will remain at a manageable level throughout the duration of the shift. Patient understands prescribed pain medication regimen for its uses and side effects and how to appropriately rate pain. Patient reported pain , pain being managed with PRN pain medication per the e-MAR. Will continue to monitor and reassess.     6/18/2019 1004 by Comfort Abraham RN  Outcome: Ongoing

## 2019-06-18 NOTE — CONSULTS
[address]               [facility] [facility], [address] [place] [address] Av                                  CONSULTATION    PATIENT NAME: Christal Paris                     :        1960  MED REC NO:   [id number]                          ROOM:       5519  ACCOUNT NO:   [de-identified]                           ADMIT DATE: 2019  PROVIDER:     Anju Cordova MD    CONSULT DATE:  2019    INTERNAL MEDICINE CONSULT NOTE    HISTORY OF PRESENT ILLNESS:  This patient is a 50-year-old black female  with a history of essential hypertension and history of severe DJD, but  no other significant medical problems, who comes in for total hip  replacement. Postoperatively, she denies any chest pain, shortness of  breath, or any other problems. PRESENT MEDICATIONS:  As per the med sheet. SOCIAL HISTORY:  She does not smoke. Does not drink excessively. FAMILY HISTORY:  Otherwise noncontributory. REVIEW OF SYSTEMS:  Otherwise noncontributory. PHYSICAL EXAMINATION:  GENERAL:  Currently, she appears in no acute distress. VITAL SIGNS:  Blood pressure is 110/60, respiratory rate 12 and easy. HEENT:  Head:  Atraumatic and normocephalic. Eyes:  Sclerae  noninjected. NECK:  No jugular venous distention. No adenopathy. LUNGS:  Clear. HEART:  Normal S1 and S2 without any murmurs or gallops. ABDOMEN:  Soft without tenderness. EXTREMITIES:  Reveals a wound VAC in the right hip, otherwise no  cyanosis, clubbing, or edema. LABORATORY DATA:  Labs preoperatively show normal electrolytes except  for slightly elevated BUN of 27, creatinine is 0.6. Liver functions are  within normal limits. Coags are okay. Glycohemoglobin is 5.1%. Hemoglobin and hematocrit are 14.0 and 43.4, white count of 4.2,  platelets of 018. EKG shows sinus rhythm within normal limits. IMPRESSION:  1. Status post hip replacement. 2.  Essential hypertension, controlled. PLAN:  1.   DVT

## 2019-06-18 NOTE — CARE COORDINATION
2019  [facility])  Clinical Case Management Department    Consult received to assist with discharge planning. Pt has agreed to have home care through [address] [place] [address]. Referral made with them to follow and will fax orders tomorrow. Pt will also need a rolling walker upon discharge. Referral made with Guillermo from [facility] to deliver prior to discharge. Patient: Yina Denny  MRN: [id number] / : 1960  ACCT: [de-identified]          Admission Documentation  Attending Provider: Nicolas Santamaria MD  Admit date/time: 2019  6:51 AM  Status: Inpatient [101]  Diagnosis: Osteoarthritis of right hip, unspecified osteoarthritis type     Readmission within last 30 days:  no     Living Situation  Discharge Planning  Living Arrangements: Alone  Support Systems: Friends/Neighbors  Potential Assistance Needed: Outpatient PT/OT  Type of Home Care Services: None  Patient expects to be discharged to[de-identified] Friend will stay with her post op at home  Expected Discharge Date: 19    Service Assessment       Values / Beliefs  Do you have any ethnic, cultural, sacramental, or spiritual Hinduism needs you would like us to be aware of while you are in the hospital?: No    Advance Directives (For Healthcare)  Healthcare Directive: No, patient does not have an advance directive for healthcare treatment                        [address] [facility] at Discharge: [address], Occupational Therapy and Nursing 2x week  Home care at home?  Yes Provider: [facility] Provider [address]  PT/OT/SN  Pharmacy:[facility] pharmacy  Potential Assistance Purchasing Medications:  No  Does patient want to participate in local refill/meds to beds program?: Yes    Goals of Care  Patient expects to be discharged to[de-identified] Friend will stay with her post op at home           Mode of transport from hospital: family will transport to home    Factors facilitating achievement of predicted outcomes: Family support, Cooperative and Pleasant    Barriers to discharge: BP low today     Quin Bartholomew RN  [facility]  Case Management Department  Ph: [phone]: [phone]

## 2019-06-18 NOTE — PROGRESS NOTES
Patient's blood pressure running low, 82/45, 250 ml/hr bolus ordered and started. Patient reports feeling light headed when going from standing to sitting. Will continue to monitor.

## 2019-06-18 NOTE — PROGRESS NOTES
Department of Orthopedic Surgery  Nurse Practitioner   Progress Note    Subjective:     Post-Operative Day: 1 Status Post right Total Hip Arthroplasty  Systemic or Specific Complaints: Patient sitting up in the chair. Having some pain but well controlled. Pt feeling a bit dizzy with position changes, but it resolves quickly. Family at bedside. Pt eager to go home today. Objective:     Patient Vitals for the past 24 hrs:   BP Temp Temp src Pulse Resp SpO2   06/18/19 1015 106/65 -- -- 73 16 98 %   06/18/19 0745 (!) 103/44 98.8 °F (37.1 °C) Oral 63 16 100 %   06/18/19 0417 116/72 97.8 °F (36.6 °C) Oral 52 16 98 %   06/18/19 0042 109/69 -- -- 57 -- --   06/17/19 2330 (!) 76/33 98 °F (36.7 °C) Oral 64 16 98 %   06/17/19 1936 102/67 97.7 °F (36.5 °C) Oral 62 16 92 %   06/17/19 1625 111/70 97.8 °F (36.6 °C) Oral 78 20 100 %   06/17/19 1552 (!) 112/49 97.9 °F (36.6 °C) Temporal 70 22 99 %   06/17/19 1530 (!) 116/54 -- -- 58 17 100 %   06/17/19 1515 (!) 120/50 -- -- 61 20 100 %   06/17/19 1500 132/64 -- -- 79 26 100 %   06/17/19 1445 (!) 93/47 -- -- 50 10 100 %   06/17/19 1430 (!) 94/46 -- -- 50 11 100 %   06/17/19 1415 (!) 98/54 -- -- (!) 48 15 100 %   06/17/19 1400 (!) 101/53 -- -- 52 11 99 %   06/17/19 1345 (!) 141/71 -- -- 78 19 98 %   06/17/19 1330 126/62 -- -- (!) 48 17 100 %   06/17/19 1316 -- -- -- 59 -- --   06/17/19 1315 (!) 147/74 -- -- 59 18 100 %   06/17/19 1300 (!) 141/57 -- -- 58 13 100 %   06/17/19 1245 (!) 139/113 -- -- 65 15 100 %   06/17/19 1230 (!) 153/70 -- -- 67 15 100 %   06/17/19 1225 130/67 -- -- 71 14 99 %   06/17/19 1220 (!) 146/71 -- -- 68 13 98 %   06/17/19 1216 (!) 147/73 96.9 °F (36.1 °C) Temporal 80 16 95 %       General: alert, appears stated age, cooperative and no distress   Wound: Wound clean and dry no evidence of infection. Motion: Painful range of Motion   DVT Exam: No evidence of DVT seen on physical exam.       NVI in lower extremity. Thigh swollen but soft.  Moving foot and ankle. Data Review  CBC:   Lab Results   Component Value Date    WBC 8.5 06/18/2019    RBC 3.41 06/18/2019    HGB 10.9 06/18/2019    HCT 32.0 06/18/2019     06/18/2019       Assessment:     Status Post right Total Hip Arthroplasty. Doing well postoperatively. Plan:      1: Discharge today, Return to Clinic: 2 weeks : Post-op labs reviewed and stable. Medicine following. BP low overnight, improved this morning after bolus overnight. 2:  Continue Deep venous thrombosis prophylaxis - asa 325 mg BID  3:  Continue physical therapy, OT, WBAT  4:  Continue Pain Control  5:  Discharge home this afternoon with [facility] and assistance. Pt will need walker at discharge. Scripts on patient's chart, discharge instructions completed. Follow up with Dr. Mayte Niño 2 weeks. Edited to add: Pt's BP dropped to 82/45 and pt complaining of dizziness. Discussed with Dr. Christine Mendes, hold D/C at this time and pt to get 250 cc NS bolus over 1 hour. Monitor BP. If improves, potential discharge home later today vs. tomorrow. Discussed with RN and pt.

## 2019-06-18 NOTE — PLAN OF CARE
Problem: Pain:  Goal: Control of acute pain  Description  Patient's right hip pain will remain at a manageable level throughout the duration of the shift. Patient understands prescribed pain medication regimen for its uses and side effects. Patient understands how to rate pain appropriately and when to call out if experiencing breakthrough pain. Patient understands how to utilize non pharmacological pain management techniques. Pain last rated at 7/10, patient medicated per [facility] with PO pain medication. Will continue to monitor and reassess. Outcome: Ongoing     Problem: Mobility - Impaired:  Goal: Achieve maximum mobility level  Description  Achieve maximum mobility level. Patient's mobility is impaired due to total right hip surgery, POD 0 . Patient  is tolerating ambulation well with a walker and x1 assist. RN encouraging out of bed ambulation. Patient up in chair for dinner, currently back in bed will continue to monitor and reassess. Outcome: Ongoing     Problem: Falls - Risk of:  Goal: Will remain free from falls  Description  Patient will remain free of falls throughout the duration of the shift. Bed locked in lowest position. Non skid socks on. Bed and chair alarm activated. Call light and belongings within reach. Patient calls out approprietly. Room door open. x1 assist with walker. Will continue to monitor and reassess.     Outcome: Ongoing

## 2019-06-18 NOTE — PROGRESS NOTES
Physical Therapy    Facility/Department: [facility] 5T ORTHO/NEURO  Initial Assessment    NAME: Daniella Zayas  : 1960  MRN: [id number]    Date of Service: 2019    Discharge Recommendations:Karly Jansen scored a 18/24 on the AM-PAC short mobility form. Current research shows that an AM-PAC score of 18 or greater is typically associated with a discharge to the patient's home setting. Based on the patients AM-PAC score and their current functional mobility deficits, it is recommended that the patient have 2-3 sessions per week of Physical Therapy at d/c to increase the patients independence. PT Equipment Recommendations  Equipment Needed: (rolling walker)    Assessment   Assessment: 61 yo admitted 19 for R THR. Pt demo slow progress this am but plans to return home today. Pt/friend report she will have 24 hour assist at home. If home, recommend 24 hour assist, home PT, use of rolling walker (needs). Pt may benefit from staying one more night in hospital but she declined. Treatment Diagnosis: mobility impairment due to R THR  Decision Making: Medium Complexity  Patient Education: Pt educated on PT role, need to call for assist to get up, anterior hip precautions, home dispo and she verbalized understanding. REQUIRES PT FOLLOW UP: Yes  Activity Tolerance  Activity Tolerance: Patient limited by endurance; Patient limited by pain; Patient limited by cognitive status       Patient Diagnosis(es): The encounter diagnosis was S/P hip replacement, right.     has a past medical history of Arthritis, Broken teeth, Eczema, and Hypertension. has a past surgical history that includes Colonoscopy and Total hip arthroplasty (Right, 2019).     Restrictions  Position Activity Restriction  Other position/activity restrictions: up with assist, anterior hip precautions, WBAT R     Vision/Hearing  Vision: Within Functional Limits(reading glasses)  Hearing: Within functional limits Subjective  General  Chart Reviewed: Yes  Additional Pertinent Hx: 61 yo admitted 6/17/19 for R THR per Dr. Cristhian Pizarro. Pmhx:  HTN. Family / Caregiver Present: Yes(friend Avelino Carroll))  Diagnosis: R THR  Follows Commands: Within Functional Limits  Subjective  Subjective: Pt found reclined in chair and agreeable to PT. \" I'm hoping to go home today. \"  Pain Screening  Patient Currently in Pain: Yes(rates hip pain 5/10, RN aware)         Orientation  Orientation  Overall Orientation Status: Within Functional Limits     Social/Functional History  Social/Functional History  Lives With: Alone  Type of Home: Apartment  Home Layout: One level  Home Access: Stairs to enter with rails  Entrance Stairs - Number of Steps: 6 + 1 (threshold) ANEL with R rail  Bathroom Shower/Tub: Tub/Shower unit  Bathroom Toilet: Standard(with toilet raiser)  Bathroom Equipment: Toilet raiser  Home Equipment: Quad cane, Lift chair  ADL Assistance: Independent  Homemaking Assistance: Independent  Ambulation Assistance: Independent(with quad cane)  Transfer Assistance: Independent  Active : Yes  Occupation: Full time employment  Type of occupation: Nursing Assistant  Additional Comments: Pt has someone to assist with laundry post-op. Pt has friend who will stay with her but has to work during the day. Pt reports her neighbor does not work and 'might' be able to assist prn. Pt's last fall was in February on ice. Objective          AROM RLE (degrees)  RLE AROM: WFL(hip flexion to 90 degrees; knee flexion to 90 degrees)  AROM LLE (degrees)  LLE AROM : WFL(hip/knee flexion to 90 degrees)           Bed mobility  Supine to Sit: Contact guard assistance(HOB up and use of rail; slow and effortful)  Sit to Supine: Minimal assistance(for LEs)  Comment: Pt plans to sleep in lift chair at home.       Transfers  Sit to Stand: Contact guard assistance(x3 trials with vc for hand placement;slow and effortful)  Stand to sit: Contact guard assistance(x3 trials with vc for hand placement; slow and effortful-difficulty bending forward at waist)     Ambulation  Device: Rolling Walker  Assistance: Contact guard assistance  Quality of Gait: forward posture with slow marifer; pt demo step to gait pattern leading R; WBAT R; slow and effortful  Distance: 25 ftx2, 20 ft  Stairs/Curb  Stairs?: (up/down 4 steps x2 with cane/rail CGA; max vc and pt demo difficulty staying on task and retaining teaching)        Exercises  Comments: Pt demo THR HEP x10 with min assist for SLR/hip abduction; visual and verbal cues     Plan   Plan  Times per week: 7  Times per day: Twice a day  Current Treatment Recommendations: Strengthening, Transfer Training, Gait Training, Stair training, Functional Mobility Training  Safety Devices  Type of devices: Nurse notified, Call light within reach, Left in chair, Chair alarm in place             AM-PAC Score  AM-PAC Inpatient Mobility Raw Score : 18 (06/18/19 1252)  AM-PAC Inpatient T-Scale Score : 43.63 (06/18/19 1252)  Mobility Inpatient CMS 0-100% Score: 46.58 (06/18/19 1252)  Mobility Inpatient CMS G-Code Modifier : CK (06/18/19 1252)          Goals  Short term goals  Time Frame for Short term goals: discharge  Short term goal 1: sit to/from stand supervision  Short term goal 2: ambulate 50 ft with rolling walker supervision  Short term goal 3: up/down 6 steps with rail/cane supervision  Patient Goals   Patient goals : return home       Therapy Time   Individual Concurrent Group Co-treatment   Time In 0955         Time Out 1034         Minutes 39           Timed Code Treatment Minutes: 29      Total Treatment Minutes:  39    This note will serve as a discharge summary if patient is discharged from hospital before next treatment session.        Felix Hirsch, PT

## 2019-06-18 NOTE — PROGRESS NOTES
AMADO.    PMHx= arthritis, HTN    Family / Caregiver Present: No    Diagnosis: Right hip osteoarthritis    Subjective  Subjective: Pt found up in restroom with RN upon entry; agreeable to OT. Pain Assessment: pain with mobility, not rated       Social/Functional History  Social/Functional History  Lives With: Alone  Type of Home: Apartment  Home Layout: One level  Home Access: Stairs to enter with rails  Entrance Stairs - Number of Steps: 6 + 1 (threshold) ANEL with R rail  Bathroom Shower/Tub: Tub/Shower unit  Bathroom Toilet: Standard(with toilet raiser)  Bathroom Equipment: Toilet raiser  Home Equipment: Quad cane, Lift chair  ADL Assistance: Independent  Homemaking Assistance: Independent  Ambulation Assistance: Independent(with quad cane)  Transfer Assistance: Independent  Active : Yes  Occupation: Full time employment  Type of occupation: Nursing Assistant  Additional Comments: Pt has someone to assist with laundry post-op. Pt has friend who will stay with her but has to work during the day. Pt reports her neighbor does not work and 'might' be able to assist prn. Pt's last fall was in February on ice. Objective    Treatment included functional transfer training, ADLs, and patient education. Vision: Within Functional Limits(reading glasses)  Hearing: Within functional limits      Orientation  Overall Orientation Status: Within Functional Limits    Balance  Sitting Balance: Supervision    Standing Balance  Activity: functional mobility in room/restroom, standing for ADLs, toilet and sit-stand transfers     Functional Mobility  Functional - Mobility Device: Rolling Walker  Activity: To/from bathroom  Assist Level: Contact guard assistance(progressing to SBA )  Functional Mobility Comments: Pt requires cues for sequencing (anterior hip precautions), tends to leave walker aside when approaching transfer surfaces (sink, toilet).  Stability and safety improve as session progressed    Toilet Transfers  Toilet - Technique: Ambulating(with RW)  Equipment Used: Standard toilet(heavy reliance on B grab bars)  Toilet Transfer: Contact guard assistance  Toilet Transfers Comments: Pt has toilet raiser at home- will place BSC over toilet to simulate home environment     ADL  Feeding: Independent  Grooming: Independent(standing at sink to wash hands and face)  LE Dressing: Minimal assistance(to don pants, cues for sequencing)  Toileting: Contact guard assistance(steady assist )    Transfers  Sit to stand: Contact guard assistance  Stand to sit: Contact guard assistance(cues for hand placement)    Cognition  Overall Cognitive Status: WFL    LUE AROM (degrees)  LUE AROM : WFL  RUE AROM (degrees)  RUE AROM : WFL        Plan   Plan  Times per week: 7x  Times per day: Daily  If patient discharges prior to next treatment, this note will serve as discharge summary. Will continue per POC if patient does not discharge. AM-PAC Score        AM-PAC Inpatient Daily Activity Raw Score: 21 (06/18/19 0948)  AM-PAC Inpatient ADL T-Scale Score : 44.27 (06/18/19 0948)  ADL Inpatient CMS 0-100% Score: 32.79 (06/18/19 0948)  ADL Inpatient CMS G-Code Modifier : CJ (06/18/19 0770)    Goals  Short term goals  Time Frame for Short term goals: By d/c   Short term goal 1: RTS transfer with spvn   Short term goal 2: Functional mobility in room/restroom with spvn and no cues for safety  Short term goal 3: Don pants/underwear with setup and AE prn     Patient Goals   Patient goals :  To return home        Therapy Time   Individual Concurrent Group Co-treatment   Time In 0800         Time Out 0842         Minutes 42         Timed Code Treatment Minutes:   27    Total Treatment Minutes:  [address], [place]

## 2019-06-19 VITALS
RESPIRATION RATE: 16 BRPM | SYSTOLIC BLOOD PRESSURE: 110 MMHG | BODY MASS INDEX: 32.87 KG/M2 | TEMPERATURE: 98.9 F | HEIGHT: 62 IN | OXYGEN SATURATION: 100 % | HEART RATE: 94 BPM | DIASTOLIC BLOOD PRESSURE: 75 MMHG | WEIGHT: 178.6 LBS

## 2019-06-19 PROBLEM — I95.81 POSTOPERATIVE HYPOTENSION: Status: ACTIVE | Noted: 2019-06-19

## 2019-06-19 PROBLEM — D50.0 BLOOD LOSS ANEMIA: Status: ACTIVE | Noted: 2019-06-19

## 2019-06-19 LAB
BASOPHILS ABSOLUTE: 0 K/UL (ref 0–0.2)
BASOPHILS RELATIVE PERCENT: 0.7 %
EOSINOPHILS ABSOLUTE: 0 K/UL (ref 0–0.6)
EOSINOPHILS RELATIVE PERCENT: 0.7 %
HCT VFR BLD CALC: 29 % (ref 36–48)
HEMOGLOBIN: 9.5 G/DL (ref 12–16)
LYMPHOCYTES ABSOLUTE: 1.4 K/UL (ref 1–5.1)
LYMPHOCYTES RELATIVE PERCENT: 21.5 %
MCH RBC QN AUTO: 30.8 PG (ref 26–34)
MCHC RBC AUTO-ENTMCNC: 32.8 G/DL (ref 31–36)
MCV RBC AUTO: 94 FL (ref 80–100)
MONOCYTES ABSOLUTE: 0.7 K/UL (ref 0–1.3)
MONOCYTES RELATIVE PERCENT: 11 %
NEUTROPHILS ABSOLUTE: 4.4 K/UL (ref 1.7–7.7)
NEUTROPHILS RELATIVE PERCENT: 66.1 %
PDW BLD-RTO: 13.7 % (ref 12.4–15.4)
PLATELET # BLD: 162 K/UL (ref 135–450)
PMV BLD AUTO: 8 FL (ref 5–10.5)
RBC # BLD: 3.08 M/UL (ref 4–5.2)
WBC # BLD: 6.7 K/UL (ref 4–11)

## 2019-06-19 PROCEDURE — 99231 SBSQ HOSP IP/OBS SF/LOW 25: CPT | Performed by: INTERNAL MEDICINE

## 2019-06-19 PROCEDURE — 85025 COMPLETE CBC W/AUTO DIFF WBC: CPT

## 2019-06-19 PROCEDURE — 6370000000 HC RX 637 (ALT 250 FOR IP): Performed by: INTERNAL MEDICINE

## 2019-06-19 PROCEDURE — 6370000000 HC RX 637 (ALT 250 FOR IP): Performed by: PHYSICIAN ASSISTANT

## 2019-06-19 PROCEDURE — 2580000003 HC RX 258: Performed by: PHYSICIAN ASSISTANT

## 2019-06-19 PROCEDURE — 97116 GAIT TRAINING THERAPY: CPT

## 2019-06-19 PROCEDURE — 97110 THERAPEUTIC EXERCISES: CPT

## 2019-06-19 PROCEDURE — 6370000000 HC RX 637 (ALT 250 FOR IP): Performed by: ORTHOPAEDIC SURGERY

## 2019-06-19 PROCEDURE — 36415 COLL VENOUS BLD VENIPUNCTURE: CPT

## 2019-06-19 PROCEDURE — 97530 THERAPEUTIC ACTIVITIES: CPT

## 2019-06-19 RX ORDER — ATENOLOL 50 MG/1
50 TABLET ORAL DAILY
Qty: 30 TABLET | Refills: 0
Start: 2019-06-19 | End: 2019-08-29 | Stop reason: SDUPTHER

## 2019-06-19 RX ORDER — LANOLIN ALCOHOL/MO/W.PET/CERES
325 CREAM (GRAM) TOPICAL
Qty: 30 TABLET | Refills: 0 | Status: SHIPPED | OUTPATIENT
Start: 2019-06-19 | End: 2019-08-29 | Stop reason: SDUPTHER

## 2019-06-19 RX ORDER — FERROUS SULFATE 325(65) MG
325 TABLET ORAL
Status: DISCONTINUED | OUTPATIENT
Start: 2019-06-19 | End: 2019-06-19 | Stop reason: HOSPADM

## 2019-06-19 RX ADMIN — NAPROXEN 250 MG: 500 TABLET ORAL at 09:27

## 2019-06-19 RX ADMIN — PREDNISONE 10 MG: 10 TABLET ORAL at 09:28

## 2019-06-19 RX ADMIN — FERROUS SULFATE TAB 325 MG (65 MG ELEMENTAL FE) 325 MG: 325 (65 FE) TAB at 09:28

## 2019-06-19 RX ADMIN — HYDROCORTISONE: 25 CREAM TOPICAL at 09:30

## 2019-06-19 RX ADMIN — OXYCODONE HYDROCHLORIDE AND ACETAMINOPHEN 1 TABLET: 5; 325 TABLET ORAL at 09:28

## 2019-06-19 RX ADMIN — Medication 10 ML: at 09:29

## 2019-06-19 RX ADMIN — TRIAMCINOLONE ACETONIDE: 1 CREAM TOPICAL at 09:29

## 2019-06-19 RX ADMIN — OXYCODONE HYDROCHLORIDE AND ACETAMINOPHEN 1 TABLET: 5; 325 TABLET ORAL at 05:05

## 2019-06-19 RX ADMIN — ATENOLOL 50 MG: 50 TABLET ORAL at 09:28

## 2019-06-19 RX ADMIN — ASPIRIN 325 MG: 325 TABLET, DELAYED RELEASE ORAL at 09:28

## 2019-06-19 ASSESSMENT — PAIN - FUNCTIONAL ASSESSMENT
PAIN_FUNCTIONAL_ASSESSMENT: ACTIVITIES ARE NOT PREVENTED
PAIN_FUNCTIONAL_ASSESSMENT: ACTIVITIES ARE NOT PREVENTED

## 2019-06-19 ASSESSMENT — PAIN SCALES - GENERAL
PAINLEVEL_OUTOF10: 0
PAINLEVEL_OUTOF10: 5
PAINLEVEL_OUTOF10: 3
PAINLEVEL_OUTOF10: 0
PAINLEVEL_OUTOF10: 0
PAINLEVEL_OUTOF10: 8
PAINLEVEL_OUTOF10: 0

## 2019-06-19 ASSESSMENT — PAIN DESCRIPTION - PROGRESSION
CLINICAL_PROGRESSION: NOT CHANGED
CLINICAL_PROGRESSION: NOT CHANGED

## 2019-06-19 ASSESSMENT — PAIN DESCRIPTION - PAIN TYPE
TYPE: SURGICAL PAIN

## 2019-06-19 ASSESSMENT — PAIN DESCRIPTION - FREQUENCY
FREQUENCY: CONTINUOUS
FREQUENCY: CONTINUOUS

## 2019-06-19 ASSESSMENT — PAIN DESCRIPTION - ONSET
ONSET: ON-GOING
ONSET: ON-GOING

## 2019-06-19 ASSESSMENT — PAIN DESCRIPTION - LOCATION
LOCATION: HIP

## 2019-06-19 ASSESSMENT — PAIN DESCRIPTION - DESCRIPTORS
DESCRIPTORS: ACHING
DESCRIPTORS: SORE
DESCRIPTORS: ACHING;SORE

## 2019-06-19 ASSESSMENT — PAIN DESCRIPTION - ORIENTATION
ORIENTATION: RIGHT

## 2019-06-19 NOTE — PROGRESS NOTES
Patient is alert and oriented. MD notified of low blood pressure. Patient's IV fluids switched to normal saline and rate changed to 125 ml/hr per MD order. Patient's pain is managed with oral pain medication. Patient ambulates x1 with a walker. Patient tolerates ambulation well. Patient is voiding bladder without complication. Bed is in the lowest position. Bed alarm is activated. Call light is within reach. Will continue to monitor and reassess.

## 2019-06-19 NOTE — PROGRESS NOTES
Patient alert and oriented x4, VSS, neurochecks WDL, surgical dressing to right hip, CDI no drainage from [address] wound vac. Patient reports pain, 5/10 and pain being treated with PRN pain medications. Fall precautions in place, will continue to monitor.

## 2019-06-19 NOTE — PLAN OF CARE
Problem: Pain:  Goal: Pain level will decrease  Description  Pain level will decrease  Outcome: Ongoing   RN assesses pain using 0-10 scale. Patient understands how to rate pain using 0-10 scale. Pain is controled with medication per MAR. RN encourages patient to call out for breakthrough pain. Will continue to monitor and reassess. Problem: Falls - Risk of:  Goal: Will remain free from falls  Outcome: Ongoing   Patient remains free from falls during this shift. Patient is up x1 person assist with walker. Bed is in the lowest position and the bed and chair alarm is activated. Anti-slip socks are on. Call light is within reach. Will continue to monitor and reassess.

## 2019-06-19 NOTE — PROGRESS NOTES
Patient discharged home. Discharge instructions reviewed with patient, all questions and concerns addressed. IV removed. All personal patient belongings gathered, including home walker, and sent home with patient. Medications picked-up at retail pharmacy. Patient left the unit via wheelchair.

## 2019-06-19 NOTE — PROGRESS NOTES
Hospital Medicine  Progress Note    Chief Complaint: Post op hypotension  SUBJECTIVE: Patient is improved. There are not new complaints. blood pressure treated with IV fluids, dizzyness now resolved    OBJECTIVE      Medications    Infusion Medications   Scheduled Medications    ferrous sulfate  325 mg Oral Daily with breakfast    predniSONE  10 mg Oral Daily    atenolol  50 mg Oral Daily    calcium elemental  500 mg Oral BID    hydrocortisone   Topical BID    lisinopril-hydrochlorothiazide  1 tablet Oral Daily    methocarbamol  500 mg Oral Daily    naproxen  250 mg Oral Daily    PRENATAL VITAMIN PLUS LOW IRON  1 tablet Oral BID    triamcinolone   Topical BID    [START ON 2019] vitamin D  50,000 Units Oral Weekly    sodium chloride flush  10 mL Intravenous 2 times per day    docusate sodium  100 mg Oral BID    aspirin  325 mg Oral BID       Physical   Temperature:  Current - Temp: 98.9 °F (37.2 °C);  Max - Temp  Av.3 °F (36.8 °C)  Min: 97.5 °F (36.4 °C)  Max: 98.9 °F (37.2 °C)    Respiratory Rate : Resp  Av  Min: 16  Max: 16    Pulse Range: Pulse  Av.7  Min: 60  Max: 94    Blood Presuure Range:  Systolic (06RKF), RJR:416 , Min:82 , CPD:722   ; Diastolic (76YNB), BXQ:86, Min:45, Max:82      Pulse ox Range: SpO2  Av.6 %  Min: 95 %  Max: 100 %    24hr I & O:      Intake/Output Summary (Last 24 hours) at 2019 0827  Last data filed at 2019 0802  Gross per 24 hour   Intake 1300 ml   Output 0 ml   Net 1300 ml       Constitutional:  awake, alert, cooperative, no apparent distress, and appears stated age  Eyes:  pupils equal, round and reactive to light  ENT:  normocepalic, without obvious abnormality  NECK:  supple, symmetrical, trachea midline  HEMATOLOGIC/LYMPHATICS:  no cervical lymphadenopathy  LUNGS:  No increased work of breathing, good air exchange, clear to auscultation bilaterally, no crackles or wheezing  CARDIOVASCULAR: regular rate and rhythm, S1, S2 normal, no murmur, click, rub or gallop  ABDOMEN:  soft, non-tender, without masses or organomegaly  MUSCULOSKELETAL:  There is no redness, warmth, or swelling of the joints. Full range of motion noted. Motor strength is 5 out of 5 all extremities bilaterally.   Tone is normal.  SKIN:  normal skin color, texture, turgor    Data      CBC:   Lab Results   Component Value Date    WBC 6.7 06/19/2019    RBC 3.08 06/19/2019    HGB 9.5 06/19/2019    HCT 29.0 06/19/2019    MCV 94.0 06/19/2019    MCH 30.8 06/19/2019    MCHC 32.8 06/19/2019    RDW 13.7 06/19/2019     06/19/2019    MPV 8.0 06/19/2019     CMP:    Lab Results   Component Value Date     06/18/2019    K 4.4 06/18/2019     06/18/2019    CO2 23 06/18/2019    BUN 32 06/18/2019    CREATININE 0.6 06/18/2019    GFRAA >60 06/18/2019    AGRATIO 1.7 05/31/2019    LABGLOM >60 06/18/2019    GLUCOSE 118 06/18/2019    PROT 7.8 05/31/2019    LABALBU 3.4 06/18/2019    CALCIUM 8.5 06/18/2019    BILITOT 0.3 05/31/2019    ALKPHOS 103 05/31/2019    AST 18 05/31/2019    ALT 13 05/31/2019         ASSESSMENT AND PLAN      Patient Active Hospital Problem List:   S/P hip replacement, right (6/17/2019)    Assessment: Post op hypotension resolved    Plan: DC IV fluids, PT,IS iron   HTN (hypertension) (4/17/2014)    Assessment: Stable    Plan: Hold prinizide   I fpatient remains free of dizzyness OK to discharge from medical point of view, will follow up with Her PCP

## 2019-06-19 NOTE — PROGRESS NOTES
Department of Orthopedic Surgery  Nurse Practitioner   Progress Note    Subjective:     Post-Operative Day: 2 Status Post right Total Hip Arthroplasty  Systemic or Specific Complaints: Patient sitting up in the chair. Pain well controlled and denies any dizziness at this time. Patient is ready to go home today. Objective:     Patient Vitals for the past 24 hrs:   BP Temp Temp src Pulse Resp SpO2   06/19/19 0745 128/71 98.9 °F (37.2 °C) Oral 94 16 100 %   06/19/19 0505 115/82 -- -- -- -- --   06/19/19 0444 (!) 93/55 98.5 °F (36.9 °C) Oral 80 16 95 %   06/18/19 2304 (!) 101/54 98 °F (36.7 °C) Oral 85 16 98 %   06/18/19 2000 (!) 90/48 97.5 °F (36.4 °C) Oral 60 16 100 %   06/18/19 1715 119/78 -- -- -- -- --   06/18/19 1554 91/60 98.2 °F (36.8 °C) Oral 73 16 96 %   06/18/19 1330 97/64 -- -- -- -- --   06/18/19 1315 (!) 86/52 -- -- -- -- --   06/18/19 1116 (!) 82/45 98.5 °F (36.9 °C) Oral 72 16 96 %   06/18/19 1015 106/65 -- -- 73 16 98 %       General: alert, appears stated age, cooperative and no distress   Wound: Wound clean and dry no evidence of infection. Motion: Painful range of Motion   DVT Exam: No evidence of DVT seen on physical exam.       NVI in lower extremity. Thigh swollen but soft. Moving foot and ankle. Data Review  CBC:   Lab Results   Component Value Date    WBC 6.7 06/19/2019    RBC 3.08 06/19/2019    HGB 9.5 06/19/2019    HCT 29.0 06/19/2019     06/19/2019       Assessment:     Status Post right Total Hip Arthroplasty. Doing well postoperatively. Plan:      1: Discharge today, Return to Clinic: 2 weeks : Post-op labs reviewed and stable. Medicine following. Discharge was cancelled yesterday due to pt's BP and dizziness. Improved, okay for discharge. 2:  Continue Deep venous thrombosis prophylaxis - asa 325 mg BID  3:  Continue physical therapy, OT, WBAT  4:  Continue Pain Control  5:  Discharge home with [facility] and assistance. Pt will need walker at discharge.  Scripts were sent down to meds to beds yesterday, discharge instructions completed. Follow up with Dr. Venecia Bui 2 weeks. Discussed with Dr. Thomas Reddy.     To Pimentel CNP

## 2019-06-19 NOTE — PROGRESS NOTES
Physical Therapy  Facility/Department: [facility] 5T ORTHO/NEURO  Daily Treatment Note  NAME: Mallika Sanchez  : 1960  MRN: [id number]    Date of Service: 2019    Discharge Recommendations: Mallika Sanchez scored a 18/24 on the AM-PAC short mobility form. Current research shows that an AM-PAC score of 18 or greater is typically associated with a discharge to the patient's home setting. Based on the patients AM-PAC score and their current functional mobility deficits, it is recommended that the patient have 2-3 sessions per week of Physical Therapy at d/c to increase the patients independence. Assessment   Assessment:Transfers and stair management were limited and unsafe due to contracture in left knee. Pt is unable to bend knee more than approx 60-70 degrees. Pt has been negotiating stairs by turning sideways or going backwards; managed stairs with therapist without incident but unsafe and a high fall risk. Treatment Diagnosis: mobility impairment due to R THR  Decision Making: Medium Complexity  Patient Education: Pt educated on PT role, need to call for assist to get up, anterior hip precautions, home dispo and she verbalized understanding. REQUIRES PT FOLLOW UP: Yes     Patient Diagnosis(es): The encounter diagnosis was S/P hip replacement, right.     has a past medical history of Arthritis, Broken teeth, Eczema, and Hypertension. has a past surgical history that includes Colonoscopy and Total hip arthroplasty (Right, 2019). Restrictions  Position Activity Restriction  Other position/activity restrictions: up with assist, anterior hip precautions, WBAT R  Subjective   General  Additional Pertinent Hx: 59 yo admitted 19 for R THR per Dr. Kathy Burgess. Pmhx:  HTN.    Subjective  Subjective: Pt was sitting up willing to participate   Pain Screening  Patient Currently in Pain: Yes(\"sore\" )  Pain Assessment  Pain Type: Surgical pain  Pain Location: Hip  Pain Orientation: Right  Pain Descriptors: Sore  Vital Signs  BP: 110/75  Patient Currently in Pain: Yes(\"sore\" )       Orientation  Orientation  Overall Orientation Status: Within Functional Limits  Objective   Transfers  Sit to Stand: Contact guard assistance  Stand to sit: Contact guard assistance  Ambulation 1  Device: Rolling Walker  Assistance: Contact guard assistance  Quality of Gait: forward posture with slow marifer; pt demo step to gait pattern leading R; WBAT R; slow and effortful  Distance: 25ft + 25ft +110 ft (performed 3 steps half way through distance). Stairs/Curb  Stairs?: Yes  Stairs  # Steps : 3  Stairs Height: 6\"  Rails: Bilateral  Assistance: Contact guard assistance  Comment: presents with hard end feel ROM in left knee approx measurement of 60 degrees of flexion; limiting proper stair management. Balance  Sitting - Static: Good  Sitting - Dynamic: Good  Standing - Static: Fair  Standing - Dynamic: Fair;-  Exercises  Knee Long Arc Quad: x10 B  Ankle Pumps: x10 B   Comments: therex limited by needing to use to use restroom.       AM-PAC Score  AM-PAC Inpatient Mobility Raw Score : 18 (06/19/19 1101)  AM-PAC Inpatient T-Scale Score : 43.63 (06/19/19 1101)  Mobility Inpatient CMS 0-100% Score: 46.58 (06/19/19 1101)  Mobility Inpatient CMS G-Code Modifier : CK (06/19/19 1101)          Goals  Short term goals  Time Frame for Short term goals: discharge  Short term goal 1: sit to/from stand supervision  ongoing  Short term goal 2: ambulate 50 ft with rolling walker supervision   ongoing  Short term goal 3: up/down 6 steps with rail/cane supervision   ongoing  Patient Goals   Patient goals : return home    Plan    Plan  Times per week: 7  Times per day: Twice a day  Current Treatment Recommendations: Strengthening, Transfer Training, Gait Training, Stair training, Functional Mobility Training  Safety Devices  Type of devices: Nurse notified, Call light within reach, Left in chair, Chair alarm in place     Therapy Time Individual Concurrent Group Co-treatment   Time In 0955         Time Out 1040         Minutes 45         Timed Code Treatment Minutes: [address] [place], [facility]     Addendum:  0/3 goals met. Continue per POC.    Myriam Humphrey, PT

## 2019-06-21 ENCOUNTER — TELEPHONE (OUTPATIENT)
Dept: ORTHOPEDIC SURGERY | Age: 59
End: 2019-06-21

## 2019-06-21 NOTE — TELEPHONE ENCOUNTER
Spoke with Tracy. I called the patient and she stated that she is seeing blood in the tube of the wound vac. She is concerned with the amount of blood. She stated he PT with be there about 9. I asked that the PT call me to discuss the blood.

## 2019-06-25 ENCOUNTER — TELEPHONE (OUTPATIENT)
Dept: ORTHOPEDIC SURGERY | Age: 59
End: 2019-06-25

## 2019-06-25 LAB
HCT VFR BLD CALC: 28 % (ref 35–45)
HEMOGLOBIN: 9.1 G/DL (ref 11.7–15.5)

## 2019-06-25 NOTE — TELEPHONE ENCOUNTER
BANDAR CALLED THE PT'S PT WANTING ORDERS ON HER DRESSING CHANGE FOR THE PT.  BANDAR STATED SHE LEFT A MESSAGE ON VIKTOR'S VM WITH HER INFO ON IT.

## 2019-06-26 ENCOUNTER — TELEPHONE (OUTPATIENT)
Dept: ORTHOPEDIC SURGERY | Age: 59
End: 2019-06-26

## 2019-06-26 DIAGNOSIS — Z96.641 S/P HIP REPLACEMENT, RIGHT: Primary | ICD-10-CM

## 2019-06-26 NOTE — TELEPHONE ENCOUNTER
Patient requesting a refill of percocet 5/325 mg 1-2 tabs PO Q4-6 hrs prn pain.     Joseline Hernández [address], [address] [facility] [phone]  Associate Signed OrdersProvidersCurrent Interactions

## 2019-06-26 NOTE — TELEPHONE ENCOUNTER
Spoke with Tracy. She is aware to put a dressing on until patient is seen. She is also aware that we will remove the sutures at her visit.

## 2019-06-27 RX ORDER — OXYCODONE HYDROCHLORIDE AND ACETAMINOPHEN 5; 325 MG/1; MG/1
1 TABLET ORAL EVERY 6 HOURS PRN
Qty: 28 TABLET | Refills: 0 | Status: SHIPPED | OUTPATIENT
Start: 2019-06-27 | End: 2019-07-04

## 2019-07-02 ENCOUNTER — OFFICE VISIT (OUTPATIENT)
Dept: ORTHOPEDIC SURGERY | Age: 59
End: 2019-07-02
Payer: COMMERCIAL

## 2019-07-02 VITALS
WEIGHT: 178.57 LBS | BODY MASS INDEX: 32.86 KG/M2 | HEIGHT: 62 IN | HEART RATE: 63 BPM | DIASTOLIC BLOOD PRESSURE: 85 MMHG | SYSTOLIC BLOOD PRESSURE: 137 MMHG

## 2019-07-02 DIAGNOSIS — M25.562 CHRONIC PAIN OF LEFT KNEE: ICD-10-CM

## 2019-07-02 DIAGNOSIS — G89.29 CHRONIC PAIN OF LEFT KNEE: ICD-10-CM

## 2019-07-02 DIAGNOSIS — Z96.641 S/P HIP REPLACEMENT, RIGHT: Primary | ICD-10-CM

## 2019-07-02 PROCEDURE — 20610 DRAIN/INJ JOINT/BURSA W/O US: CPT | Performed by: PHYSICIAN ASSISTANT

## 2019-07-02 PROCEDURE — 99024 POSTOP FOLLOW-UP VISIT: CPT | Performed by: PHYSICIAN ASSISTANT

## 2019-07-08 ENCOUNTER — TELEPHONE (OUTPATIENT)
Dept: ORTHOPEDIC SURGERY | Age: 59
End: 2019-07-08

## 2019-07-08 DIAGNOSIS — Z96.641 S/P HIP REPLACEMENT, RIGHT: Primary | ICD-10-CM

## 2019-07-08 RX ORDER — OXYCODONE HYDROCHLORIDE AND ACETAMINOPHEN 5; 325 MG/1; MG/1
1 TABLET ORAL EVERY 6 HOURS PRN
Qty: 28 TABLET | Refills: 0 | Status: SHIPPED | OUTPATIENT
Start: 2019-07-08 | End: 2019-07-15

## 2019-07-16 ENCOUNTER — OFFICE VISIT (OUTPATIENT)
Dept: ORTHOPEDIC SURGERY | Age: 59
End: 2019-07-16
Payer: COMMERCIAL

## 2019-07-16 VITALS
BODY MASS INDEX: 32.86 KG/M2 | SYSTOLIC BLOOD PRESSURE: 136 MMHG | WEIGHT: 178.57 LBS | HEART RATE: 56 BPM | HEIGHT: 62 IN | DIASTOLIC BLOOD PRESSURE: 81 MMHG

## 2019-07-16 DIAGNOSIS — G89.29 CHRONIC PAIN OF RIGHT THUMB: ICD-10-CM

## 2019-07-16 DIAGNOSIS — Z96.641 S/P HIP REPLACEMENT, RIGHT: Primary | ICD-10-CM

## 2019-07-16 DIAGNOSIS — M79.644 CHRONIC PAIN OF RIGHT THUMB: ICD-10-CM

## 2019-07-16 PROCEDURE — 20605 DRAIN/INJ JOINT/BURSA W/O US: CPT | Performed by: ORTHOPAEDIC SURGERY

## 2019-07-16 PROCEDURE — 99024 POSTOP FOLLOW-UP VISIT: CPT | Performed by: ORTHOPAEDIC SURGERY

## 2019-07-16 RX ORDER — CYCLOBENZAPRINE HCL 10 MG
10 TABLET ORAL 3 TIMES DAILY PRN
Qty: 90 TABLET | Refills: 0 | Status: SHIPPED | OUTPATIENT
Start: 2019-07-16 | End: 2019-07-26

## 2019-07-16 RX ORDER — OXYCODONE HYDROCHLORIDE AND ACETAMINOPHEN 5; 325 MG/1; MG/1
1 TABLET ORAL EVERY 6 HOURS PRN
Qty: 28 TABLET | Refills: 0 | Status: SHIPPED | OUTPATIENT
Start: 2019-07-16 | End: 2019-07-23

## 2019-07-16 NOTE — PROGRESS NOTES
Patient Name: Cuauhtemoc Camejo MRN: [id number]   Age: 60 y.o. YOB: 1960   Sex: female         CHIEF COMPLAINT     No chief complaint on file. HISTORY OF PRESENT ILLNESS   Patient returns for their second postoperative evaluation status post total hip replacement. The patient is doing very well. They have moderate complaints of pain. Patient notes pain is an 8 out of 10 is a combination of the anterior thigh and the knee. There is moderate decreasing  swelling about the Hip. The patient has been doing home physical therapy. Patient notes she is having a lot of trouble with stairs especially going down due to the decrease in flexion in the left knee and due to the surgery on the right side. Is also notes some issues with dizziness and did have some mild blood pressure issues while she was in the hospital but has had them managed and maintained and are within normal limits today and through previous vist.      Patient notes that the previous dizziness onset typically with positional changes and long periods of standing. She denies any falls or loss of consciousness due to this. They deny any calf swelling or numbness or tingling down the leg    Complaints of right thumb basilar pain. Increase irritation recently with use of walker. Assessment   PHYSICAL EXAM   Vital Signs: There were no vitals filed for this visit. Examination of the hip shows a well-healed incision. Edges are well opposed. There is moderate swelling. There is no drainage. Range of motion is up to 90. There is no calf tenderness. The patient is neurovascularly intact. No signs of DVT. Calf nontender    Gross distal sensation is decreased in the lateral thigh from the area of the incision as expected. Left knee extension to 0 degrees and flexion to 70-75 degrees with mild diffuse tenderness to the knee minimal swelling noted.   Mild osteophyte formation palpated medially and

## 2019-07-24 RX ORDER — VITAMIN A, VITAMIN C, VITAMIN D-3, VITAMIN E, VITAMIN B-1, VITAMIN B-2, NIACIN, VITAMIN B-6, CALCIUM, IRON, ZINC, COPPER 4000; 120; 400; 22; 1.84; 3; 20; 10; 1; 12; 200; 27; 25; 2 [IU]/1; MG/1; [IU]/1; MG/1; MG/1; MG/1; MG/1; MG/1; MG/1; UG/1; MG/1; MG/1; MG/1; MG/1
TABLET ORAL
Qty: 60 TABLET | Refills: 0 | Status: SHIPPED | OUTPATIENT
Start: 2019-07-24 | End: 2019-09-09 | Stop reason: SDUPTHER

## 2019-07-25 ENCOUNTER — TELEPHONE (OUTPATIENT)
Dept: ORTHOPEDIC SURGERY | Age: 59
End: 2019-07-25

## 2019-08-27 ENCOUNTER — OFFICE VISIT (OUTPATIENT)
Dept: ORTHOPEDIC SURGERY | Age: 59
End: 2019-08-27

## 2019-08-27 VITALS
WEIGHT: 178.57 LBS | HEIGHT: 62 IN | HEART RATE: 83 BPM | SYSTOLIC BLOOD PRESSURE: 162 MMHG | BODY MASS INDEX: 32.86 KG/M2 | DIASTOLIC BLOOD PRESSURE: 102 MMHG

## 2019-08-27 DIAGNOSIS — Z96.641 S/P HIP REPLACEMENT, RIGHT: Primary | ICD-10-CM

## 2019-08-27 PROCEDURE — 99024 POSTOP FOLLOW-UP VISIT: CPT | Performed by: ORTHOPAEDIC SURGERY

## 2019-08-28 NOTE — PROGRESS NOTES
Patient Name: Bo Allison MRN: [id number]   Age: 60 y.o. YOB: 1960   Sex: female         [address] Complaint   Patient presents with    Post-Op Check     RT AMADO 6/17/19       HISTORY OF PRESENT ILLNESS   Patient returns for their  postoperative evaluation status post total hip replacement. The patient is doing very well. They have minimal complaints of pain. There is moderate swelling about the Hip. The patient has been doing home physical therapy. They deny any calf swelling or numbness or tingling down the leg     Assessment   PHYSICAL EXAM   Vital Signs:    Vitals:    08/27/19 1030   BP: (!) 162/102   Pulse:        Examination of the hip shows a well-healed incision. Edges are well opposed. There is mild swelling. There is no drainage. Range of motion is up to 110. There is no calf tenderness. The patient is neurovascularly intact. No signs of DVT. Calf nontender  Opposite hip is tsable and limited pain. Good overall rom of the hip. Back with noted facet irritation. Bilateral knees with flexion only to 095 degrees. Gross distal sensation is decreased in the lateral thigh from the area of the incision as expected. RADIOLOGY   X-Rays reviewed: AP and lateral x-rays of the hip show excellent alignment of the total hip prosthesis. There is no loosening no fractures noted. Xray   Have reviewed the xrays above from previous visit  and my impression is:    IMPRESSION   8 weeks status post total hip replacement    PLAN   The patient is doing well 8 weeks status post total hip replacement. 1. S/P hip replacement, right     - Ambulatory referral to Physical Therapy    We will continue physical therapy for aggressive range of motion and strengthening. They will continue ice and elevation for the hip. They will continue aspirin therapy for DVT prophylaxis.     Walking with difficulty still but this could be less secondary to the right hip replacemnt and more to the left hip back and the knees.

## 2019-08-29 ENCOUNTER — OFFICE VISIT (OUTPATIENT)
Dept: PRIMARY CARE CLINIC | Age: 59
End: 2019-08-29
Payer: COMMERCIAL

## 2019-08-29 VITALS
TEMPERATURE: 97.5 F | HEIGHT: 62 IN | DIASTOLIC BLOOD PRESSURE: 90 MMHG | SYSTOLIC BLOOD PRESSURE: 178 MMHG | BODY MASS INDEX: 33.75 KG/M2 | HEART RATE: 60 BPM | WEIGHT: 183.4 LBS

## 2019-08-29 DIAGNOSIS — I10 ESSENTIAL HYPERTENSION: Primary | ICD-10-CM

## 2019-08-29 DIAGNOSIS — M16.11 PRIMARY OSTEOARTHRITIS OF ONE HIP, RIGHT: ICD-10-CM

## 2019-08-29 DIAGNOSIS — D64.9 ANEMIA, UNSPECIFIED TYPE: ICD-10-CM

## 2019-08-29 DIAGNOSIS — E78.5 HYPERLIPIDEMIA, UNSPECIFIED HYPERLIPIDEMIA TYPE: ICD-10-CM

## 2019-08-29 LAB
BASOPHILS ABSOLUTE: 0.1 K/UL (ref 0–0.2)
BASOPHILS RELATIVE PERCENT: 1.2 %
EOSINOPHILS ABSOLUTE: 0 K/UL (ref 0–0.6)
EOSINOPHILS RELATIVE PERCENT: 0.6 %
HCT VFR BLD CALC: 43.2 % (ref 36–48)
HEMOGLOBIN: 14.5 G/DL (ref 12–16)
LYMPHOCYTES ABSOLUTE: 1.4 K/UL (ref 1–5.1)
LYMPHOCYTES RELATIVE PERCENT: 32.8 %
MCH RBC QN AUTO: 30.7 PG (ref 26–34)
MCHC RBC AUTO-ENTMCNC: 33.5 G/DL (ref 31–36)
MCV RBC AUTO: 91.7 FL (ref 80–100)
MONOCYTES ABSOLUTE: 0.3 K/UL (ref 0–1.3)
MONOCYTES RELATIVE PERCENT: 7.9 %
NEUTROPHILS ABSOLUTE: 2.5 K/UL (ref 1.7–7.7)
NEUTROPHILS RELATIVE PERCENT: 57.5 %
PDW BLD-RTO: 14.1 % (ref 12.4–15.4)
PLATELET # BLD: 239 K/UL (ref 135–450)
PMV BLD AUTO: 7.9 FL (ref 5–10.5)
RBC # BLD: 4.71 M/UL (ref 4–5.2)
WBC # BLD: 4.4 K/UL (ref 4–11)

## 2019-08-29 PROCEDURE — G8427 DOCREV CUR MEDS BY ELIG CLIN: HCPCS | Performed by: INTERNAL MEDICINE

## 2019-08-29 PROCEDURE — 36415 COLL VENOUS BLD VENIPUNCTURE: CPT | Performed by: INTERNAL MEDICINE

## 2019-08-29 PROCEDURE — 3017F COLORECTAL CA SCREEN DOC REV: CPT | Performed by: INTERNAL MEDICINE

## 2019-08-29 PROCEDURE — 99214 OFFICE O/P EST MOD 30 MIN: CPT | Performed by: INTERNAL MEDICINE

## 2019-08-29 PROCEDURE — G8417 CALC BMI ABV UP PARAM F/U: HCPCS | Performed by: INTERNAL MEDICINE

## 2019-08-29 PROCEDURE — 1036F TOBACCO NON-USER: CPT | Performed by: INTERNAL MEDICINE

## 2019-08-29 RX ORDER — PSEUDOEPHEDRINE HCL 30 MG
100 TABLET ORAL 2 TIMES DAILY
Qty: 30 CAPSULE | Refills: 3 | Status: SHIPPED | OUTPATIENT
Start: 2019-08-29 | End: 2019-11-27 | Stop reason: ALTCHOICE

## 2019-08-29 RX ORDER — LISINOPRIL AND HYDROCHLOROTHIAZIDE 25; 20 MG/1; MG/1
TABLET ORAL
Qty: 30 TABLET | Refills: 5 | Status: SHIPPED | OUTPATIENT
Start: 2019-08-29 | End: 2020-05-11 | Stop reason: SDUPTHER

## 2019-08-29 RX ORDER — LANOLIN ALCOHOL/MO/W.PET/CERES
325 CREAM (GRAM) TOPICAL
Qty: 30 TABLET | Refills: 3 | Status: SHIPPED | OUTPATIENT
Start: 2019-08-29 | End: 2020-10-06 | Stop reason: ALTCHOICE

## 2019-08-29 RX ORDER — TRIAMCINOLONE ACETONIDE 1 MG/G
CREAM TOPICAL
Qty: 45 G | Refills: 3 | Status: SHIPPED | OUTPATIENT
Start: 2019-08-29 | End: 2019-11-04 | Stop reason: SDUPTHER

## 2019-08-29 RX ORDER — ATENOLOL 50 MG/1
50 TABLET ORAL DAILY
Qty: 30 TABLET | Refills: 3 | Status: SHIPPED | OUTPATIENT
Start: 2019-08-29 | End: 2019-11-04 | Stop reason: SDUPTHER

## 2019-08-29 NOTE — PROGRESS NOTES
Tony Peter  YOB: 1960    Date of Service:  8/29/2019    Chief Complaint:   Tony Peter is a 60 y.o. female who presents for elevated bp and other concerns. HPI: patient is elevated in office, she has h/o hypertension her medications stopped in hospital during hip surgery. Patient was on pain medications now she is not on pain medications so her bp at home elevated. Hyperlipidemia : stable on lipitor. patient denies any side effects of medication . OA: s/p hip surgery . Patient is anemic during surgery patient discussed to get evaluation from GI . Review of Systems:  Constitutional: Negative for chills, fever, malaise/fatigue and weight loss. HENT: Negative for headaches, ear discharge, ear pain, hearing loss, sore throat, blurry vision and double vision. Respiratory: Negative for cough,  sputum production, hemoptysis, shortness of breath and wheezing. Cardiovascular: Negative for chest pain, palpitations, orthopnea, claudication and leg swelling. Gastrointestinal: Negative for abdominal pain, nausea and vomiting, constipation, diarrhea, heartburn, blood in stool, melena. Genitourinary: Negative for dysuria, flank pain, frequency, hematuria and urgency. Musculoskeletal: Negative for back pain, myalgias and neck pain. Knee pain. Shoulder pain. Neurological: Negative for dizziness, seizure, sensory change, focal weakness, loss of consciousness . Psychiatric/Behavioral: Negative for depression and substance abuse. The patient does not have insomnia. Skin: Rash, skin lesion, itching, acne, numerous moles.      Vitals:    08/29/19 1354   BP: (!) 178/90   Pulse:    Temp:      Wt Readings from Last 3 Encounters:   08/29/19 183 lb 6.4 oz (83.2 kg)   08/27/19 178 lb 9.2 oz (81 kg)   07/16/19 178 lb 9.2 oz (81 kg)     BP Readings from Last 3 Encounters:   08/29/19 (!) 178/90   08/27/19 (!) 162/102   07/16/19 136/81         Physical

## 2019-09-09 ENCOUNTER — OFFICE VISIT (OUTPATIENT)
Dept: PRIMARY CARE CLINIC | Age: 59
End: 2019-09-09
Payer: COMMERCIAL

## 2019-09-09 VITALS
SYSTOLIC BLOOD PRESSURE: 124 MMHG | DIASTOLIC BLOOD PRESSURE: 66 MMHG | WEIGHT: 185 LBS | TEMPERATURE: 98.4 F | BODY MASS INDEX: 34.39 KG/M2 | HEART RATE: 64 BPM

## 2019-09-09 DIAGNOSIS — I10 ESSENTIAL HYPERTENSION: Primary | ICD-10-CM

## 2019-09-09 PROCEDURE — G8417 CALC BMI ABV UP PARAM F/U: HCPCS | Performed by: INTERNAL MEDICINE

## 2019-09-09 PROCEDURE — 99213 OFFICE O/P EST LOW 20 MIN: CPT | Performed by: INTERNAL MEDICINE

## 2019-09-09 PROCEDURE — 3017F COLORECTAL CA SCREEN DOC REV: CPT | Performed by: INTERNAL MEDICINE

## 2019-09-09 PROCEDURE — G8427 DOCREV CUR MEDS BY ELIG CLIN: HCPCS | Performed by: INTERNAL MEDICINE

## 2019-09-09 PROCEDURE — 1036F TOBACCO NON-USER: CPT | Performed by: INTERNAL MEDICINE

## 2019-09-09 RX ORDER — VITAMIN A, VITAMIN C, VITAMIN D-3, VITAMIN E, VITAMIN B-1, VITAMIN B-2, NIACIN, VITAMIN B-6, CALCIUM, IRON, ZINC, COPPER 4000; 120; 400; 22; 1.84; 3; 20; 10; 1; 12; 200; 27; 25; 2 [IU]/1; MG/1; [IU]/1; MG/1; MG/1; MG/1; MG/1; MG/1; MG/1; UG/1; MG/1; MG/1; MG/1; MG/1
TABLET ORAL
Qty: 60 TABLET | Refills: 5 | Status: SHIPPED | OUTPATIENT
Start: 2019-09-09 | End: 2020-10-06 | Stop reason: ALTCHOICE

## 2019-09-09 NOTE — PROGRESS NOTES
Relationships    Social connections:     Talks on phone: Not on file     Gets together: Not on file     Attends Sabianist service: Not on file     Active member of club or organization: Not on file     Attends meetings of clubs or organizations: Not on file     Relationship status: Not on file    Intimate partner violence:     Fear of current or ex partner: Not on file     Emotionally abused: Not on file     Physically abused: Not on file     Forced sexual activity: Not on file   Other Topics Concern    Not on file   Social History Narrative    Not on file       Assessment/Plan:    1.  Essential hypertension  bp is ok after restart of medication    Prinzide   Atenolol   Monitor blood pressure regularly  Regular exercise and low salt diet

## 2019-09-10 ENCOUNTER — HOSPITAL ENCOUNTER (OUTPATIENT)
Dept: PHYSICAL THERAPY | Age: 59
Setting detail: THERAPIES SERIES
Discharge: HOME | End: 2019-09-10
Payer: COMMERCIAL

## 2019-09-10 PROCEDURE — 97162 PT EVAL MOD COMPLEX 30 MIN: CPT

## 2019-09-10 PROCEDURE — 97110 THERAPEUTIC EXERCISES: CPT

## 2019-11-04 ENCOUNTER — OFFICE VISIT (OUTPATIENT)
Dept: PRIMARY CARE CLINIC | Age: 59
End: 2019-11-04
Payer: COMMERCIAL

## 2019-11-04 VITALS
WEIGHT: 189.6 LBS | BODY MASS INDEX: 35.24 KG/M2 | SYSTOLIC BLOOD PRESSURE: 132 MMHG | HEART RATE: 64 BPM | TEMPERATURE: 97.9 F | DIASTOLIC BLOOD PRESSURE: 70 MMHG

## 2019-11-04 DIAGNOSIS — L81.9 HYPOPIGMENTED SKIN LESION: ICD-10-CM

## 2019-11-04 DIAGNOSIS — Z23 FLU VACCINE NEED: Primary | ICD-10-CM

## 2019-11-04 PROCEDURE — 99213 OFFICE O/P EST LOW 20 MIN: CPT | Performed by: INTERNAL MEDICINE

## 2019-11-04 PROCEDURE — 90471 IMMUNIZATION ADMIN: CPT | Performed by: INTERNAL MEDICINE

## 2019-11-04 PROCEDURE — G8417 CALC BMI ABV UP PARAM F/U: HCPCS | Performed by: INTERNAL MEDICINE

## 2019-11-04 PROCEDURE — 90686 IIV4 VACC NO PRSV 0.5 ML IM: CPT | Performed by: INTERNAL MEDICINE

## 2019-11-04 PROCEDURE — G8482 FLU IMMUNIZE ORDER/ADMIN: HCPCS | Performed by: INTERNAL MEDICINE

## 2019-11-04 PROCEDURE — G8427 DOCREV CUR MEDS BY ELIG CLIN: HCPCS | Performed by: INTERNAL MEDICINE

## 2019-11-04 PROCEDURE — 3017F COLORECTAL CA SCREEN DOC REV: CPT | Performed by: INTERNAL MEDICINE

## 2019-11-04 PROCEDURE — 1036F TOBACCO NON-USER: CPT | Performed by: INTERNAL MEDICINE

## 2019-11-04 RX ORDER — ATENOLOL 50 MG/1
50 TABLET ORAL DAILY
Qty: 30 TABLET | Refills: 5 | Status: SHIPPED | OUTPATIENT
Start: 2019-11-04 | End: 2020-05-11 | Stop reason: SDUPTHER

## 2019-11-04 RX ORDER — TRIAMCINOLONE ACETONIDE 1 MG/G
CREAM TOPICAL
Qty: 45 G | Refills: 3 | Status: SHIPPED | OUTPATIENT
Start: 2019-11-04 | End: 2020-10-06 | Stop reason: SDUPTHER

## 2019-11-06 ENCOUNTER — TELEPHONE (OUTPATIENT)
Dept: FAMILY MEDICINE CLINIC | Age: 59
End: 2019-11-06

## 2019-11-07 ENCOUNTER — HOSPITAL ENCOUNTER (OUTPATIENT)
Dept: PHYSICAL THERAPY | Age: 59
Setting detail: THERAPIES SERIES
Discharge: HOME | End: 2019-11-07
Payer: COMMERCIAL

## 2019-11-27 ENCOUNTER — OFFICE VISIT (OUTPATIENT)
Dept: ORTHOPEDIC SURGERY | Age: 59
End: 2019-11-27
Payer: COMMERCIAL

## 2019-11-27 VITALS
HEART RATE: 56 BPM | SYSTOLIC BLOOD PRESSURE: 126 MMHG | BODY MASS INDEX: 35.8 KG/M2 | HEIGHT: 61 IN | DIASTOLIC BLOOD PRESSURE: 65 MMHG | WEIGHT: 189.6 LBS

## 2019-11-27 DIAGNOSIS — Z96.641 S/P HIP REPLACEMENT, RIGHT: Primary | ICD-10-CM

## 2019-11-27 PROCEDURE — 99213 OFFICE O/P EST LOW 20 MIN: CPT | Performed by: PHYSICIAN ASSISTANT

## 2019-11-27 PROCEDURE — G8482 FLU IMMUNIZE ORDER/ADMIN: HCPCS | Performed by: PHYSICIAN ASSISTANT

## 2019-11-27 PROCEDURE — 3017F COLORECTAL CA SCREEN DOC REV: CPT | Performed by: PHYSICIAN ASSISTANT

## 2019-11-27 PROCEDURE — G8427 DOCREV CUR MEDS BY ELIG CLIN: HCPCS | Performed by: PHYSICIAN ASSISTANT

## 2019-11-27 PROCEDURE — 1036F TOBACCO NON-USER: CPT | Performed by: PHYSICIAN ASSISTANT

## 2019-11-27 PROCEDURE — G8417 CALC BMI ABV UP PARAM F/U: HCPCS | Performed by: PHYSICIAN ASSISTANT

## 2019-11-27 ASSESSMENT — ENCOUNTER SYMPTOMS
WHEEZING: 0
ALLERGIC/IMMUNOLOGIC NEGATIVE: 1
EYES NEGATIVE: 1
DIARRHEA: 0
NAUSEA: 0
CONSTIPATION: 0
COUGH: 0
SHORTNESS OF BREATH: 0
VOMITING: 0
SORE THROAT: 0
ABDOMINAL PAIN: 0

## 2019-12-11 ENCOUNTER — TELEPHONE (OUTPATIENT)
Dept: PRIMARY CARE CLINIC | Age: 59
End: 2019-12-11

## 2020-02-07 ENCOUNTER — TELEPHONE (OUTPATIENT)
Dept: RHEUMATOLOGY | Age: 60
End: 2020-02-07

## 2020-02-07 NOTE — TELEPHONE ENCOUNTER
PA SUBMITTED FOR Diclofenac Sodium 1% gel ON [facility]  Key: [id number] - PA Case ID: [id number] - Rx #: [id number]     PENDING REVIEW.

## 2020-10-06 ENCOUNTER — OFFICE VISIT (OUTPATIENT)
Dept: PRIMARY CARE CLINIC | Age: 60
End: 2020-10-06
Payer: COMMERCIAL

## 2020-10-06 VITALS
OXYGEN SATURATION: 96 % | SYSTOLIC BLOOD PRESSURE: 144 MMHG | TEMPERATURE: 97.2 F | HEART RATE: 62 BPM | HEIGHT: 63 IN | WEIGHT: 195 LBS | BODY MASS INDEX: 34.55 KG/M2 | DIASTOLIC BLOOD PRESSURE: 100 MMHG

## 2020-10-06 PROCEDURE — 90686 IIV4 VACC NO PRSV 0.5 ML IM: CPT | Performed by: FAMILY MEDICINE

## 2020-10-06 PROCEDURE — G8427 DOCREV CUR MEDS BY ELIG CLIN: HCPCS | Performed by: FAMILY MEDICINE

## 2020-10-06 PROCEDURE — 3017F COLORECTAL CA SCREEN DOC REV: CPT | Performed by: FAMILY MEDICINE

## 2020-10-06 PROCEDURE — G8417 CALC BMI ABV UP PARAM F/U: HCPCS | Performed by: FAMILY MEDICINE

## 2020-10-06 PROCEDURE — 90471 IMMUNIZATION ADMIN: CPT | Performed by: FAMILY MEDICINE

## 2020-10-06 PROCEDURE — 1036F TOBACCO NON-USER: CPT | Performed by: FAMILY MEDICINE

## 2020-10-06 PROCEDURE — G8482 FLU IMMUNIZE ORDER/ADMIN: HCPCS | Performed by: FAMILY MEDICINE

## 2020-10-06 PROCEDURE — 99214 OFFICE O/P EST MOD 30 MIN: CPT | Performed by: FAMILY MEDICINE

## 2020-10-06 RX ORDER — LISINOPRIL AND HYDROCHLOROTHIAZIDE 25; 20 MG/1; MG/1
TABLET ORAL
Qty: 30 TABLET | Refills: 5 | Status: SHIPPED | OUTPATIENT
Start: 2020-10-06

## 2020-10-06 RX ORDER — TRIAMCINOLONE ACETONIDE 1 MG/G
CREAM TOPICAL
Qty: 45 G | Refills: 3 | Status: SHIPPED | OUTPATIENT
Start: 2020-10-06 | End: 2021-03-22 | Stop reason: SDUPTHER

## 2020-10-06 RX ORDER — ATENOLOL 50 MG/1
50 TABLET ORAL DAILY
Qty: 30 TABLET | Refills: 5 | Status: SHIPPED | OUTPATIENT
Start: 2020-10-06

## 2020-10-06 ASSESSMENT — ENCOUNTER SYMPTOMS
SORE THROAT: 0
SHORTNESS OF BREATH: 0
CONSTIPATION: 0
VOMITING: 0
EYE PAIN: 0
DIARRHEA: 0
EYE ITCHING: 0
COUGH: 0
WHEEZING: 0
ABDOMINAL PAIN: 0
NAUSEA: 0

## 2020-10-06 ASSESSMENT — PATIENT HEALTH QUESTIONNAIRE - PHQ9
SUM OF ALL RESPONSES TO PHQ9 QUESTIONS 1 & 2: 0
SUM OF ALL RESPONSES TO PHQ QUESTIONS 1-9: 0
1. LITTLE INTEREST OR PLEASURE IN DOING THINGS: 0
SUM OF ALL RESPONSES TO PHQ QUESTIONS 1-9: 0
2. FEELING DOWN, DEPRESSED OR HOPELESS: 0

## 2020-10-06 NOTE — PROGRESS NOTES
Vaccine Information Sheet, \"Influenza - Inactivated\"  given to Sheri Edy, or parent/legal guardian of  Sheri Snow and verbalized understanding. Patient responses:    Have you ever had a reaction to a flu vaccine? No  Do you have any current illness? No  Have you ever had [place] Syndrome? No  Do you have a serious allergy to any of the follow: Neomycin, Polymyxin, Thimerosal, eggs or egg products? No    Flu vaccine given per order. Please see immunization tab. Risks and benefits explained. Current VIS given.

## 2020-10-06 NOTE — TELEPHONE ENCOUNTER
PT CALLING BACK AFTER JUST LEAVING FROM TODAY'S VISIT    SHE SAYS SHE NEEDS HER BLOOD PRESS[phone]    ATENOLOL  LISINOPRIL/HCTZ    LAST VISIT TODAY AC  NO FUTURE

## 2020-10-06 NOTE — PROGRESS NOTES
[address] [facility] [address] PRIMARY [facility]ARE  [address] [place] [id number]  Dept: [phone]  Dept Fax: [phone]     10/6/2020      Roseanne Graham   1960     Chief Complaint   Patient presents with   Garcia Fay       HPI  Pt comes in today for transition of care from previous PCP. Chart review completed with pt today. No hx abnormal paps. BP numbers at home reported as normal.    Out of creams for her eczema - needs refill. Use NSAID gel various joints prn for pain relief. Requesting refill. PHQ Scores 10/6/2020 5/31/2019 6/13/2018   PHQ2 Score 0 0 0   PHQ9 Score 0 0 0     Interpretation of Total Score Depression Severity: 1-4 = Minimal depression, 5-9 = Mild depression, 10-14 = Moderate depression, 15-19 = Moderately severe depression, 20-27 = Severe depression     Prior to Admission medications    Medication Sig Start Date End Date Taking? Authorizing Provider   hydrocortisone 2.5 % cream APPLY TOPICALLY TO THE FACE TWO TIMES A DAY AS NEEDED 10/6/20  Yes Daniella Raphael DO   diclofenac sodium (VOLTAREN) 1 % GEL Apply 4 g topically 4 times daily 10/6/20  Yes Guillaume Novak DO   triamcinolone (KENALOG) 0.1 % cream APPLY TOPICALLY TWICE A DAY 10/6/20  Yes Daniella Raphael DO   atenolol (TENORMIN) 50 MG tablet Take 1 tablet by mouth daily 10/6/20   Guillaume Novak DO   lisinopril-hydroCHLOROthiazide (PRINZIDE;ZESTORETIC) 20-25 MG per tablet TAKE ONE TABLET BY MOUTH DAILY 10/6/20   Daniella Raphael DO       Past Medical History:   Diagnosis Date    Eczema     Hypertension     Osteoarthritis         Social History     Tobacco Use    Smoking status: Never Smoker    Smokeless tobacco: Never Used   Substance Use Topics    Alcohol use:  Yes    Drug use: No        Past Surgical History:   Procedure Laterality Date    TOTAL HIP ARTHROPLASTY Right 6/17/2019    RIGHT TOTAL HIP ARTHROPLASTY ANTERIOR APPROACH performed by Carlos Paris MD [facility]        No Known Allergies     Family History   Problem Relation Age of Onset    High Blood Pressure Mother     Other Mother         dementia    Arthritis Father     No Known Problems Sister     No Known Problems Sister     No Known Problems Sister         Patient's past medical history, surgical history, family history, medications, and allergies  were all reviewed and updated as appropriate today. Review of Systems   Constitutional: Negative for fatigue, fever and unexpected weight change. HENT: Negative for congestion, ear pain and sore throat. Eyes: Negative for pain, itching and visual disturbance. Respiratory: Negative for cough, shortness of breath and wheezing. Cardiovascular: Negative for chest pain, palpitations and leg swelling. Gastrointestinal: Negative for abdominal pain, constipation, diarrhea, nausea and vomiting. Endocrine: Negative for cold intolerance, heat intolerance, polydipsia and polyuria. Genitourinary: Negative for dysuria, frequency and hematuria. Musculoskeletal: Negative for arthralgias and joint swelling. Skin: Positive for rash. Neurological: Negative for dizziness and headaches. BP (!) 144/100   Pulse 62   Temp 97.2 °F (36.2 °C)   Ht 5' 3\" (1.6 m)   Wt 195 lb (88.5 kg)   SpO2 96%   BMI 34.54 kg/m²      Physical Exam  Vitals signs reviewed. Constitutional:       General: She is not in acute distress. Appearance: Normal appearance. She is well-developed. She is obese. HENT:      Head: Normocephalic and atraumatic. Right Ear: Tympanic membrane and ear canal normal. No drainage. No middle ear effusion. Tympanic membrane is not erythematous. Left Ear: Tympanic membrane and ear canal normal. No drainage. No middle ear effusion. Tympanic membrane is not erythematous. Nose: Nose normal. No rhinorrhea.       Mouth/Throat:      Mouth: Mucous membranes are moist.      Pharynx: No oropharyngeal exudate or posterior oropharyngeal erythema. Eyes:      Extraocular Movements: Extraocular movements intact. Pupils: Pupils are equal, round, and reactive to light. Neck:      Musculoskeletal: Neck supple. Thyroid: No thyromegaly. Cardiovascular:      Rate and Rhythm: Normal rate and regular rhythm. Heart sounds: No murmur. Pulmonary:      Effort: Pulmonary effort is normal.      Breath sounds: Normal breath sounds. No wheezing. Abdominal:      General: Bowel sounds are normal.      Palpations: Abdomen is soft. There is no mass. Tenderness: There is no abdominal tenderness. Musculoskeletal: Normal range of motion. General: No swelling. Lymphadenopathy:      Cervical: No cervical adenopathy. Skin:     General: Skin is warm and dry. Findings: No rash. Neurological:      General: No focal deficit present. Mental Status: She is alert and oriented to person, place, and time. Cranial Nerves: No cranial nerve deficit.    Psychiatric:         Mood and Affect: Mood normal.       Lab Results   Component Value Date    WBC 4.4 08/29/2019    HGB 14.5 08/29/2019    HCT 43.2 08/29/2019    MCV 91.7 08/29/2019     08/29/2019     Lab Results   Component Value Date     06/18/2019    K 4.4 06/18/2019     06/18/2019    CO2 23 06/18/2019    BUN 32 (H) 06/18/2019    CREATININE 0.6 06/18/2019    GLUCOSE 118 (H) 06/18/2019    CALCIUM 8.5 06/18/2019    PROT 7.8 05/31/2019    LABALBU 3.4 06/18/2019    BILITOT 0.3 05/31/2019    ALKPHOS 103 05/31/2019    AST 18 05/31/2019    ALT 13 05/31/2019    LABGLOM >60 06/18/2019    GFRAA >60 06/18/2019    AGRATIO 1.7 05/31/2019    GLOB 2.9 05/31/2019     Lab Results   Component Value Date    LABA1C 5.1 06/12/2019       Lab Results   Component Value Date    CHOL 171 04/05/2019    CHOL 161 01/23/2017    CHOL 163 06/11/2015     Lab Results   Component Value Date    TRIG 49 04/05/2019    TRIG 48 01/23/2017    TRIG 43 06/11/2015     Lab Results   Component Value Date    HDL 79 (H) 04/05/2019    HDL 66 (H) 01/23/2017    HDL 71 (H) 06/11/2015     Lab Results   Component Value Date    LDLCALC 82 04/05/2019    [address] 85 01/23/2017    LDLCALC 83 06/11/2015     Lab Results   Component Value Date    LABVLDL 10 04/05/2019    LABVLDL 10 01/23/2017    LABVLDL 9 06/11/2015       The 10-year ASCVD risk score (Tiffanie Hart, et al., 2013) is: 7.2%    Values used to calculate the score:      Age: 63 years      Sex: Female      Is Non- : Yes      Diabetic: No      Tobacco smoker: No      Systolic Blood Pressure: 702 mmHg      Is BP treated: Yes      HDL Cholesterol: 79 mg/dL      Total Cholesterol: 171 mg/dL       Assessment:  Encounter Diagnoses   Name Primary?  Essential hypertension, benign Yes    Elevated blood pressure reading     Atopic dermatitis, unspecified type     Arthritis     History of iron deficiency     Screening, lipid     Encounter for screening mammogram for malignant neoplasm of breast     Flu vaccine need     Class 1 obesity due to excess calories without serious comorbidity with body mass index (BMI) of 34.0 to 34.9 in adult        Plan:  1. Essential hypertension, benign  Chronic, currently elevated reading in office. Reviewed current medications. Check labs and will monitor in close f/u. Consider med regimen change. - CBC Auto Differential; Future  - Comprehensive Metabolic Panel; Future  - Lipid, Fasting; Future    2. Elevated blood pressure reading  See above. 3. Atopic dermatitis, unspecified type  Chronic, reviewed maintenance regimen for treatment. Will refill steroids now. - hydrocortisone 2.5 % cream; APPLY TOPICALLY TO THE FACE TWO TIMES A DAY AS NEEDED  Dispense: 30 g; Refill: 5  - triamcinolone (KENALOG) 0.1 % cream; APPLY TOPICALLY TWICE A DAY  Dispense: 45 g; Refill: 3    4. Arthritis  Chronic - continue topical NSAIDs prn.  - diclofenac sodium (VOLTAREN) 1 % GEL;  Apply 4 g topically 4 times daily  Dispense: 4 Tube; Refill: 1    5. History of iron deficiency  Reviewed chart - repeat CBC now. - CBC Auto Differential; Future    6. Screening, lipid  - Lipid, Fasting; Future    7. Encounter for screening mammogram for malignant neoplasm of breast  Due, order placed. - AR DEYANIRA DIGITAL SCREEN BILATERAL; Future    8. Flu vaccine need  Given today, see nurse note. - INFLUENZA, QUADV, 3 YRS AND OLDER, IM PF, PREFILL SYR OR SDV, 0.5ML (AFLURIA QUADV, PF)    9. Class 1 obesity due to excess calories without serious comorbidity with body mass index (BMI) of 34.0 to 34.9 in adult      Return in about 1 month (around 11/6/2020) for WWE with pap. Jessica Brower, DO     Please note that this chart was generated using dragon dictation software. Although every effort was made to ensure the accuracy of this automated transcription, some errors in transcription may have occurred.

## 2020-10-11 PROBLEM — E66.09 CLASS 1 OBESITY DUE TO EXCESS CALORIES WITHOUT SERIOUS COMORBIDITY WITH BODY MASS INDEX (BMI) OF 34.0 TO 34.9 IN ADULT: Status: ACTIVE | Noted: 2020-10-11

## 2021-01-04 ENCOUNTER — TELEPHONE (OUTPATIENT)
Dept: PRIMARY CARE CLINIC | Age: 61
End: 2021-01-04

## 2021-01-04 NOTE — TELEPHONE ENCOUNTER
Patient was seen for initial visit in October, asked to return for a wellness visit in 1 month. This visit was never scheduled and she has not been seen since then. Please reach out to patient and let her know that we need to get her rescheduled for her wellness visit as soon as possible.

## 2021-03-12 DIAGNOSIS — Z13.220 SCREENING, LIPID: ICD-10-CM

## 2021-03-12 DIAGNOSIS — I10 ESSENTIAL HYPERTENSION, BENIGN: ICD-10-CM

## 2021-03-12 DIAGNOSIS — Z86.39 HISTORY OF IRON DEFICIENCY: ICD-10-CM

## 2021-03-12 LAB
A/G RATIO: 1.5 (ref 1.1–2.2)
ALBUMIN SERPL-MCNC: 4.5 G/DL (ref 3.4–5)
ALP BLD-CCNC: 104 U/L (ref 40–129)
ALT SERPL-CCNC: 15 U/L (ref 10–40)
ANION GAP SERPL CALCULATED.3IONS-SCNC: 11 MMOL/L (ref 3–16)
AST SERPL-CCNC: 19 U/L (ref 15–37)
BASOPHILS ABSOLUTE: 0 K/UL (ref 0–0.2)
BASOPHILS RELATIVE PERCENT: 0.6 %
BILIRUB SERPL-MCNC: <0.2 MG/DL (ref 0–1)
BUN BLDV-MCNC: 18 MG/DL (ref 7–20)
CALCIUM SERPL-MCNC: 9.1 MG/DL (ref 8.3–10.6)
CHLORIDE BLD-SCNC: 102 MMOL/L (ref 99–110)
CHOLESTEROL, FASTING: 156 MG/DL (ref 0–199)
CO2: 27 MMOL/L (ref 21–32)
CREAT SERPL-MCNC: 0.6 MG/DL (ref 0.6–1.2)
EOSINOPHILS ABSOLUTE: 0.1 K/UL (ref 0–0.6)
EOSINOPHILS RELATIVE PERCENT: 1.4 %
GFR AFRICAN AMERICAN: >60
GFR NON-AFRICAN AMERICAN: >60
GLOBULIN: 3 G/DL
GLUCOSE BLD-MCNC: 83 MG/DL (ref 70–99)
HCT VFR BLD CALC: 44 % (ref 36–48)
HDLC SERPL-MCNC: 56 MG/DL (ref 40–60)
HEMOGLOBIN: 14.6 G/DL (ref 12–16)
LDL CHOLESTEROL CALCULATED: 90 MG/DL
LYMPHOCYTES ABSOLUTE: 2.1 K/UL (ref 1–5.1)
LYMPHOCYTES RELATIVE PERCENT: 38.1 %
MCH RBC QN AUTO: 29.4 PG (ref 26–34)
MCHC RBC AUTO-ENTMCNC: 33.1 G/DL (ref 31–36)
MCV RBC AUTO: 88.9 FL (ref 80–100)
MONOCYTES ABSOLUTE: 0.4 K/UL (ref 0–1.3)
MONOCYTES RELATIVE PERCENT: 7.9 %
NEUTROPHILS ABSOLUTE: 2.8 K/UL (ref 1.7–7.7)
NEUTROPHILS RELATIVE PERCENT: 52 %
PDW BLD-RTO: 14.1 % (ref 12.4–15.4)
PLATELET # BLD: 244 K/UL (ref 135–450)
PMV BLD AUTO: 8.2 FL (ref 5–10.5)
POTASSIUM SERPL-SCNC: 4.2 MMOL/L (ref 3.5–5.1)
RBC # BLD: 4.95 M/UL (ref 4–5.2)
SODIUM BLD-SCNC: 140 MMOL/L (ref 136–145)
TOTAL PROTEIN: 7.5 G/DL (ref 6.4–8.2)
TRIGLYCERIDE, FASTING: 51 MG/DL (ref 0–150)
VLDLC SERPL CALC-MCNC: 10 MG/DL
WBC # BLD: 5.4 K/UL (ref 4–11)

## 2021-03-22 ENCOUNTER — OFFICE VISIT (OUTPATIENT)
Dept: PRIMARY CARE CLINIC | Age: 61
End: 2021-03-22
Payer: COMMERCIAL

## 2021-03-22 VITALS
SYSTOLIC BLOOD PRESSURE: 135 MMHG | OXYGEN SATURATION: 98 % | HEART RATE: 62 BPM | BODY MASS INDEX: 34.2 KG/M2 | TEMPERATURE: 97.5 F | DIASTOLIC BLOOD PRESSURE: 71 MMHG | WEIGHT: 193 LBS | HEIGHT: 63 IN

## 2021-03-22 DIAGNOSIS — M19.90 ARTHRITIS: ICD-10-CM

## 2021-03-22 DIAGNOSIS — E66.09 CLASS 1 OBESITY DUE TO EXCESS CALORIES WITHOUT SERIOUS COMORBIDITY WITH BODY MASS INDEX (BMI) OF 34.0 TO 34.9 IN ADULT: ICD-10-CM

## 2021-03-22 DIAGNOSIS — Z00.00 ANNUAL PHYSICAL EXAM: Primary | ICD-10-CM

## 2021-03-22 DIAGNOSIS — I10 ESSENTIAL HYPERTENSION, BENIGN: ICD-10-CM

## 2021-03-22 DIAGNOSIS — L20.9 ATOPIC DERMATITIS, UNSPECIFIED TYPE: ICD-10-CM

## 2021-03-22 PROCEDURE — G8482 FLU IMMUNIZE ORDER/ADMIN: HCPCS | Performed by: FAMILY MEDICINE

## 2021-03-22 PROCEDURE — 99396 PREV VISIT EST AGE 40-64: CPT | Performed by: FAMILY MEDICINE

## 2021-03-22 PROCEDURE — 86580 TB INTRADERMAL TEST: CPT | Performed by: FAMILY MEDICINE

## 2021-03-22 RX ORDER — TRIAMCINOLONE ACETONIDE 1 MG/G
CREAM TOPICAL
Qty: 45 G | Refills: 3 | Status: SHIPPED | OUTPATIENT
Start: 2021-03-22

## 2021-03-22 ASSESSMENT — ENCOUNTER SYMPTOMS
EYE ITCHING: 0
DIARRHEA: 0
SHORTNESS OF BREATH: 0
ABDOMINAL PAIN: 0
EYE PAIN: 0
VOMITING: 0
SORE THROAT: 0
CONSTIPATION: 0
NAUSEA: 0
WHEEZING: 0
COUGH: 0

## 2021-03-22 ASSESSMENT — PATIENT HEALTH QUESTIONNAIRE - PHQ9
1. LITTLE INTEREST OR PLEASURE IN DOING THINGS: 0
SUM OF ALL RESPONSES TO PHQ QUESTIONS 1-9: 0
2. FEELING DOWN, DEPRESSED OR HOPELESS: 0

## 2021-03-22 NOTE — PROGRESS NOTES
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 She does not have any acute concerns or questions today. She reports good compliance in taking all medications as directed. She is requesting some refills of her eczema topical prescriptions today. PHQ Scores 3/22/2021 10/6/2020 5/31/2019 6/13/2018   PHQ2 Score 0 0 0 0   PHQ9 Score 0 0 0 0     Interpretation of Total Score Depression Severity: 1-4 = Minimal depression, 5-9 = Mild depression, 10-14 = Moderate depression, 15-19 = Moderately severe depression, 20-27 = Severe depression     Prior to Visit Medications    Medication Sig Taking? Authorizing Provider   diclofenac sodium (VOLTAREN) 1 % GEL APPLY 4 GRAMS TOPICALLY FOUR TIMES A DAY Yes Ruthann Cancer, DO   hydrocortisone 2.5 % cream APPLY TOPICALLY TO THE FACE TWO TIMES A DAY AS NEEDED Yes Giorgio Novak, DO   triamcinolone (KENALOG) 0.1 % cream APPLY TOPICALLY TWICE A DAY Yes Ruthann Cancer, DO   atenolol (TENORMIN) 50 MG tablet Take 1 tablet by mouth daily Yes Ramila Novak, DO   lisinopril-hydroCHLOROthiazide (PRINZIDE;ZESTORETIC) 20-25 MG per tablet TAKE ONE TABLET BY MOUTH DAILY Yes Ruthann Cancer, DO       Past Medical History:   Diagnosis Date    Eczema     Hypertension     Osteoarthritis         Social History     Tobacco Use    Smoking status: Never Smoker    Smokeless tobacco: Never Used   Substance Use Topics    Alcohol use:  Yes    Drug use: No        Past Surgical History:   Procedure Laterality Date    TOTAL HIP ARTHROPLASTY Right 6/17/2019    RIGHT TOTAL HIP ARTHROPLASTY ANTERIOR APPROACH performed by Marisela Swain MD at [address]        No Known Allergies     Family History   Problem Relation Age of Onset    High Blood Pressure Mother     Other Mother         dementia    Arthritis Father     No Known Problems Sister     No Known Problems Sister     No Known Problems Sister         Patient's past medical history, surgical history, family history, medications, and allergies  were all reviewed and updated as appropriate today. Review of Systems   Constitutional: Negative for fatigue, fever and unexpected weight change. HENT: Negative for congestion, ear pain and sore throat. Eyes: Negative for pain, itching and visual disturbance. Respiratory: Negative for cough, shortness of breath and wheezing. Cardiovascular: Negative for chest pain, palpitations and leg swelling. Gastrointestinal: Negative for abdominal pain, constipation, diarrhea, nausea and vomiting. Endocrine: Negative for cold intolerance, heat intolerance, polydipsia and polyuria. Genitourinary: Negative for dysuria, frequency and hematuria. Musculoskeletal: Negative for arthralgias and joint swelling. Skin: Negative for rash. Neurological: Negative for dizziness and headaches. /71   Pulse 62   Temp 97.5 °F (36.4 °C)   Ht 5' 3\" (1.6 m)   Wt 193 lb (87.5 kg)   SpO2 98%   BMI 34.19 kg/m²      Physical Exam  Vitals signs reviewed. Constitutional:       General: She is not in acute distress. Appearance: Normal appearance. She is well-developed and normal weight. HENT:      Head: Normocephalic and atraumatic. Right Ear: Tympanic membrane and ear canal normal. No drainage. No middle ear effusion. Tympanic membrane is not erythematous. Left Ear: Tympanic membrane and ear canal normal. No drainage. No middle ear effusion. Tympanic membrane is not erythematous. Nose: Nose normal. No rhinorrhea. Mouth/Throat:      Mouth: Mucous membranes are moist.      Pharynx: No oropharyngeal exudate or posterior oropharyngeal erythema.    Eyes: Extraocular Movements: Extraocular movements intact. Pupils: Pupils are equal, round, and reactive to light. Neck:      Musculoskeletal: Neck supple. Thyroid: No thyromegaly. Cardiovascular:      Rate and Rhythm: Normal rate and regular rhythm. Heart sounds: No murmur. Pulmonary:      Effort: Pulmonary effort is normal.      Breath sounds: Normal breath sounds. No wheezing. Abdominal:      General: Bowel sounds are normal.      Palpations: Abdomen is soft. There is no mass. Tenderness: There is no abdominal tenderness. Musculoskeletal: Normal range of motion. General: No swelling. Lymphadenopathy:      Cervical: No cervical adenopathy. Skin:     General: Skin is warm and dry. Findings: No rash. Neurological:      General: No focal deficit present. Mental Status: She is alert and oriented to person, place, and time. Cranial Nerves: No cranial nerve deficit.    Psychiatric:         Mood and Affect: Mood normal.       Lab Results   Component Value Date    WBC 5.4 03/12/2021    HGB 14.6 03/12/2021    HCT 44.0 03/12/2021    MCV 88.9 03/12/2021     03/12/2021     Lab Results   Component Value Date     03/12/2021    K 4.2 03/12/2021     03/12/2021    CO2 27 03/12/2021    BUN 18 03/12/2021    CREATININE 0.6 03/12/2021    GLUCOSE 83 03/12/2021    CALCIUM 9.1 03/12/2021    PROT 7.5 03/12/2021    LABALBU 4.5 03/12/2021    BILITOT <0.2 03/12/2021    ALKPHOS 104 03/12/2021    AST 19 03/12/2021    ALT 15 03/12/2021    LABGLOM >60 03/12/2021    GFRAA >60 03/12/2021    AGRATIO 1.5 03/12/2021    GLOB 3.0 03/12/2021     Lab Results   Component Value Date    LABA1C 5.1 06/12/2019       Lab Results   Component Value Date    CHOL 171 04/05/2019    CHOL 161 01/23/2017    CHOL 163 06/11/2015     Lab Results   Component Value Date    TRIG 49 04/05/2019    TRIG 48 01/23/2017    TRIG 43 06/11/2015     Lab Results   Component Value Date    HDL 56 03/12/2021    HDL 79 (H) 04/05/2019    HDL 66 (H) 01/23/2017     Lab Results   Component Value Date    LDLCALC 90 03/12/2021    LDLCALC 82 04/05/2019    LDLCALC 85 01/23/2017     Lab Results   Component Value Date    LABVLDL 10 03/12/2021    LABVLDL 10 04/05/2019    LABVLDL 10 01/23/2017       The 10-year ASCVD risk score (Kehinde Casey, et al., 2013) is: 6.6%    Values used to calculate the score:      Age: 63 years      Sex: Female      Is Non- : Yes      Diabetic: No      Tobacco smoker: No      Systolic Blood Pressure: 055 mmHg      Is BP treated: Yes      HDL Cholesterol: 56 mg/dL      Total Cholesterol: 156 mg/dL       Assessment:  Encounter Diagnoses   Name Primary?  Annual physical exam Yes    Class 1 obesity due to excess calories without serious comorbidity with body mass index (BMI) of 34.0 to 34.9 in adult     Essential hypertension, benign     Arthritis     Atopic dermatitis, unspecified type        Plan:  1. Annual physical exam  General wellness exam. Reviewed chart for past hx and updated today. Counseled on age appropriate health guidance and discussed screening recommendations. Vaccinations reviewed and discussed. All questions answered. PPD placed today 2/2 fostering. Forms completed at her request - see scanned media section. 2. Class 1 obesity due to excess calories without serious comorbidity with body mass index (BMI) of 34.0 to 34.9 in adult    3. Essential hypertension, benign  Chronic, controlled. 4. Arthritis  - diclofenac sodium (VOLTAREN) 1 % GEL; APPLY 4 GRAMS TOPICALLY FOUR TIMES A DAY  Dispense: 250 g; Refill: 3    5. Atopic dermatitis, unspecified type  - hydrocortisone 2.5 % cream; APPLY TOPICALLY TO THE FACE TWO TIMES A DAY AS NEEDED  Dispense: 30 g; Refill: 5  - triamcinolone (KENALOG) 0.1 % cream; APPLY TOPICALLY TWICE A DAY  Dispense: 45 g; Refill: 3      Return in about 6 months (around 9/22/2021) for F/U Chronic Issues.                Stephanie Gonsalez, DO Please note that this chart was generated using dragon dictation software. Although every effort was made to ensure the accuracy of this automated transcription, some errors in transcription may have occurred.

## 2021-03-24 ENCOUNTER — NURSE ONLY (OUTPATIENT)
Dept: PRIMARY CARE CLINIC | Age: 61
End: 2021-03-24

## 2021-03-24 LAB
INDURATION: NORMAL
TB SKIN TEST: NEGATIVE

## 2021-04-12 ENCOUNTER — HOSPITAL ENCOUNTER (OUTPATIENT)
Dept: MAMMOGRAPHY | Age: 61
Discharge: HOME | End: 2021-04-17
Payer: COMMERCIAL

## 2021-04-12 DIAGNOSIS — Z12.31 ENCOUNTER FOR SCREENING MAMMOGRAM FOR MALIGNANT NEOPLASM OF BREAST: ICD-10-CM

## 2021-04-12 DIAGNOSIS — Z12.31 VISIT FOR SCREENING MAMMOGRAM: ICD-10-CM

## (undated) DEVICE — PLATE ES AD W 9FT CRD 2

## (undated) DEVICE — GARMENT,MEDLINE,DVT,INT,CALF,MED, GEN2: Brand: MEDLINE

## (undated) DEVICE — YANKAUER,OPEN TIP,W/O VENT,STERILE: Brand: MEDLINE INDUSTRIES, INC.

## (undated) DEVICE — MARKER SURG SKIN UTIL BLK REG TIP NONSMEARING W/ 6IN RUL

## (undated) DEVICE — HANDPIECE SUCTION TUBING INTERPULSE 10FT

## (undated) DEVICE — REFLECTION FLEXIBLE DRILL 35MM: Brand: REFLECTION

## (undated) DEVICE — GLOVE SURG SZ 8 L12IN FNGR THK79MIL GRN LTX FREE

## (undated) DEVICE — SUTURE MCRYL SZ 2-0 L18IN ABSRB VLT L36MM CT-1 1/2 CIR Y739D

## (undated) DEVICE — GLOVE ORANGE PI 8   MSG9080

## (undated) DEVICE — 3M™ COBAN™ NL STERILE NON-LATEX SELF-ADHERENT WRAP, 2084S, 4 IN X 5 YD (10 CM X 4,5 M), 18 ROLLS/CASE: Brand: 3M™ COBAN™

## (undated) DEVICE — SUTURE MCRYL SZ 0 L18IN ABSRB VLT L36MM CT-1 1/2 CIR Y740D

## (undated) DEVICE — SYSTEM SKIN CLSR 22CM DERMBND PRINEO

## (undated) DEVICE — ORTHO PRE OP PACK: Brand: MEDLINE INDUSTRIES, INC.

## (undated) DEVICE — DECANTER BAG 9": Brand: MEDLINE INDUSTRIES, INC.

## (undated) DEVICE — SUTURE STRATAFIX SPRL SZ 1 L14IN ABSRB VLT L48CM CTX 1/2 SXPD2B405

## (undated) DEVICE — GOWN,SIRUS,POLYRNF,BRTHSLV,XLN/XL,20/CS: Brand: MEDLINE

## (undated) DEVICE — GLOVE SURG SZ 75 L12IN FNGR THK87MIL WHT LTX FREE

## (undated) DEVICE — COUNTER NDL 40 COUNT HLD 70 NUM FOAM BLK SGL MAG W BLDE REMV

## (undated) DEVICE — SUTURE ETHBND EXCEL SZ 0 L18IN NONABSORBABLE GRN L36MM CT-1 CX21D

## (undated) DEVICE — SURE SET-DOUBLE BASIN-LF: Brand: MEDLINE INDUSTRIES, INC.

## (undated) DEVICE — DRESSING THERABOND 3D ANTIMIC CNTCT SYS 15INCHX10INCH

## (undated) DEVICE — 3M™ WARMING BLANKET, UPPER BODY, 10 PER CASE, 42268: Brand: BAIR HUGGER™

## (undated) DEVICE — 3M™ STERI-DRAPE™ INSTRUMENT POUCH 1018: Brand: STERI-DRAPE™

## (undated) DEVICE — CHLORAPREP 26ML ORANGE

## (undated) DEVICE — BIPOLAR SEALER 23-112-1 AQM 6.0: Brand: AQUAMANTYS ®

## (undated) DEVICE — COVER LT HNDL BLU PLAS

## (undated) DEVICE — COAXIAL HIGH FLOW TIP WITH SOFT SHIELD

## (undated) DEVICE — PREVENA INCISION MANAGEMENT SYSTEM- PEEL & PLACE DRESSING: Brand: PREVENA™ PEEL & PLACE™

## (undated) DEVICE — 450 ML BOTTLE OF 0.05% CHLORHEXIDINE GLUCONATE IN 99.95% STERILE WATER FOR IRRIGATION, USP AND APPLICATOR.: Brand: IRRISEPT ANTIMICROBIAL WOUND LAVAGE

## (undated) DEVICE — CUFF RESTRN WRST OR ANK 45FT AD FOAM

## (undated) DEVICE — DRESSING FOAM SELF ADH 20X10 CM ABSORBENT MEPILEX BORDER

## (undated) DEVICE — TURNOVER KIT RM INF CTRL TECH

## (undated) DEVICE — Z CONVERTED USE 2271043 CONTAINER SPEC COLL 4OZ SCR ON LID PEEL PCH

## (undated) DEVICE — DUAL CUT SAGITTAL BLADE

## (undated) DEVICE — STANDARD HYPODERMIC NEEDLE,POLYPROPYLENE HUB: Brand: MONOJECT

## (undated) DEVICE — PEEL-AWAY HOOD: Brand: FLYTE, SURGICOOL

## (undated) DEVICE — SOLUTION IV 1000ML 0.9% SOD CHL

## (undated) DEVICE — DRAPE C ARM UNIV W41XL74IN CLR PLAS XR VELC CLSR POLY STRP

## (undated) DEVICE — PACK PROCEDURE SURG TOT HIP